# Patient Record
Sex: FEMALE | Race: OTHER | Employment: UNEMPLOYED
[De-identification: names, ages, dates, MRNs, and addresses within clinical notes are randomized per-mention and may not be internally consistent; named-entity substitution may affect disease eponyms.]

---

## 2023-07-21 ENCOUNTER — HOSPITAL ENCOUNTER (OUTPATIENT)
Facility: HOSPITAL | Age: 23
Setting detail: SPECIMEN
Discharge: HOME | End: 2023-07-24

## 2023-07-21 ENCOUNTER — INITIAL PRENATAL (OUTPATIENT)
Age: 23
End: 2023-07-21

## 2023-07-21 VITALS
WEIGHT: 157 LBS | OXYGEN SATURATION: 100 % | BODY MASS INDEX: 30.82 KG/M2 | SYSTOLIC BLOOD PRESSURE: 124 MMHG | RESPIRATION RATE: 16 BRPM | HEIGHT: 60 IN | DIASTOLIC BLOOD PRESSURE: 78 MMHG

## 2023-07-21 DIAGNOSIS — Z34.01 ENCOUNTER FOR SUPERVISION OF NORMAL FIRST PREGNANCY IN FIRST TRIMESTER: Primary | ICD-10-CM

## 2023-07-21 DIAGNOSIS — Z98.891 HISTORY OF CESAREAN DELIVERY: ICD-10-CM

## 2023-07-21 DIAGNOSIS — O99.210 OBESITY AFFECTING PREGNANCY, ANTEPARTUM: ICD-10-CM

## 2023-07-21 LAB
BILIRUBIN, URINE, POC: NEGATIVE
BLOOD URINE, POC: NEGATIVE
GLUCOSE URINE, POC: NEGATIVE
KETONES, URINE, POC: NEGATIVE
LEUKOCYTE ESTERASE, URINE, POC: NEGATIVE
NITRITE, URINE, POC: NEGATIVE
PH, URINE, POC: 7 (ref 4.6–8)
PROTEIN,URINE, POC: NEGATIVE
SPECIFIC GRAVITY, URINE, POC: 1.01 (ref 1–1.03)
URINALYSIS CLARITY, POC: CLEAR
URINALYSIS COLOR, POC: YELLOW
UROBILINOGEN, POC: NORMAL

## 2023-07-21 PROCEDURE — 87591 N.GONORRHOEAE DNA AMP PROB: CPT

## 2023-07-21 PROCEDURE — 87491 CHLMYD TRACH DNA AMP PROBE: CPT

## 2023-07-21 PROCEDURE — 88175 CYTOPATH C/V AUTO FLUID REDO: CPT

## 2023-07-21 PROCEDURE — 87661 TRICHOMONAS VAGINALIS AMPLIF: CPT

## 2023-07-21 PROCEDURE — 81003 URINALYSIS AUTO W/O SCOPE: CPT | Performed by: FAMILY MEDICINE

## 2023-07-21 ASSESSMENT — PATIENT HEALTH QUESTIONNAIRE - PHQ9
SUM OF ALL RESPONSES TO PHQ9 QUESTIONS 1 & 2: 0
SUM OF ALL RESPONSES TO PHQ QUESTIONS 1-9: 0
SUM OF ALL RESPONSES TO PHQ QUESTIONS 1-9: 0
2. FEELING DOWN, DEPRESSED OR HOPELESS: 0
SUM OF ALL RESPONSES TO PHQ QUESTIONS 1-9: 0
SUM OF ALL RESPONSES TO PHQ QUESTIONS 1-9: 0
1. LITTLE INTEREST OR PLEASURE IN DOING THINGS: 0

## 2023-07-21 NOTE — PROGRESS NOTES
[unfilled]   History and Physical    Patient: Pineda Vilchis MRN: [id number]  SSN: [id number]    YOB: 2000  Age: 25 y.o. Sex: female      Subjective:      Pineda Vilchis is a 25 y.o. female  at [address] who presents for IOB visit. Planned pregnancy  FOB is involved, they live together  She has 1 other child, who also lives with her   LMP 23  Had US at [facility]     No past medical history on file. No past surgical history on file. No family history on file. Social History     Tobacco Use    Smoking status: Not on file    Smokeless tobacco: Not on file   Substance Use Topics    Alcohol use: Not on file      Prior to Admission medications    Not on File        No Known Allergies    Review of Systems:  ROS negative except as noted in HPI. Objective:     Vitals:    23 1543   BP: 124/78   Resp: 16   SpO2: 100%   Weight: 157 lb (71.2 kg)   Height: 5' 0.24\" (1.53 m)        Physical Exam:  See prenatal physical exam.    OB Dating Ultrasound    Patient is a 25 y.o. [id number] with an estimated gestational age of [address] by LMP who presents for dating ultrasound. OFFICE PROCEDURE PROGRESS NOTE    Chart reviewed for the following:   Nawaf LOPEZ DO, have reviewed the History, Physical and updated the Allergic reactions for [address] performed immediately prior to start of procedure:   Nawaf LOPEZ DO, have performed the following reviews on Pineda Vilchis prior to the start of the procedure:            * Patient was identified by name and date of birth   * Agreement on procedure being performed was verified  * Risks and Benefits explained to the patient  * Procedure site verified and marked as necessary  * Patient was positioned for comfort  * Consent was signed and verified     Time: 4:17 PM  Date of procedure: 2023  Procedure performed by:   Nawaf Thomas DO  How tolerated by patient: tolerated

## 2023-07-21 NOTE — PROGRESS NOTES
Chief Complaint   Patient presents with    Initial Prenatal Visit        Patient identified by name and . Patient is a  at [address] Taking prenatal vitamins: Yes  Leakage of fluid: No  Vaginal bleeding: No  Feeling baby move if over 20 weeks: Not yet  Contractions: No  Pain: No    Vitals:    23 1543   BP: 124/78   Resp: 16   SpO2: 100%   Weight: 157 lb (71.2 kg)   Height: 5' 0.24\" (1.53 m)          There is no immunization history on file for this patient. 1. Have you been to the ER, urgent care clinic since your last visit? Hospitalized since your last visit? No    2. Have you seen or consulted any other health care providers outside of the [address] [place] [address] since your last visit? Include any pap smears or colon screening.  No

## 2023-07-22 LAB
ABO + RH BLD: NORMAL
ABO, EXTERNAL RESULT: NORMAL
BLOOD BANK CMNT PATIENT-IMP: NORMAL
BLOOD GROUP ANTIBODIES SERPL: NORMAL
ERYTHROCYTE [DISTWIDTH] IN BLOOD BY AUTOMATED COUNT: 13.5 % (ref 11.5–14.5)
EST. AVERAGE GLUCOSE BLD GHB EST-MCNC: 91 MG/DL
HBA1C MFR BLD: 4.8 % (ref 4–5.6)
HBV SURFACE AG SER QL: <0.1 INDEX
HBV SURFACE AG SER QL: NEGATIVE
HCT VFR BLD AUTO: 39.9 % (ref 35–47)
HCV AB SERPL QL IA: NONREACTIVE
HEPATITIS C ANTIBODY, EXTERNAL RESULT: NORMAL
HGB BLD-MCNC: 13 G/DL (ref 11.5–16)
HIV 1+2 AB+HIV1 P24 AG SERPL QL IA: NONREACTIVE
HIV 1/2 RESULT COMMENT: NORMAL
HIV, EXTERNAL RESULT: NORMAL
MCH RBC QN AUTO: 29.8 PG (ref 26–34)
MCHC RBC AUTO-ENTMCNC: 32.6 G/DL (ref 30–36.5)
MCV RBC AUTO: 91.5 FL (ref 80–99)
NRBC # BLD: 0 K/UL (ref 0–0.01)
NRBC BLD-RTO: 0 PER 100 WBC
PLATELET # BLD AUTO: 337 K/UL (ref 150–400)
PMV BLD AUTO: 10.5 FL (ref 8.9–12.9)
RBC # BLD AUTO: 4.36 M/UL (ref 3.8–5.2)
RH FACTOR, EXTERNAL RESULT: POSITIVE
RPR SER QL: NONREACTIVE
RPR, EXTERNAL RESULT: NORMAL
RUBELLA TITER, EXTERNAL RESULT: NORMAL
RUBV IGG SERPL IA-ACNC: NORMAL IU/ML
SPECIMEN EXP DATE BLD: NORMAL
WBC # BLD AUTO: 11.2 K/UL (ref 3.6–11)

## 2023-07-23 LAB
BACTERIA SPEC CULT: NORMAL
SERVICE CMNT-IMP: NORMAL

## 2023-07-24 LAB — VZV IGG SER IA-ACNC: 1634 INDEX

## 2023-07-25 LAB
C TRACH RRNA SPEC QL NAA+PROBE: NEGATIVE
C. TRACHOMATIS, EXTERNAL RESULT: NEGATIVE
HGB A MFR BLD: 97.5 % (ref 96.4–98.8)
HGB A2 MFR BLD COLUMN CHROM: 2.5 % (ref 1.8–3.2)
HGB F MFR BLD: 0 % (ref 0–2)
HGB FRACT BLD-IMP: NORMAL
HGB S MFR BLD: 0 %
N GONORRHOEA RRNA SPEC QL NAA+PROBE: NEGATIVE
N. GONORRHOEAE, EXTERNAL RESULT: NEGATIVE
SPECIMEN SOURCE: NORMAL
T VAGINALIS RRNA SPEC QL NAA+PROBE: NEGATIVE

## 2023-08-18 ENCOUNTER — ROUTINE PRENATAL (OUTPATIENT)
Age: 23
End: 2023-08-18

## 2023-08-18 VITALS
OXYGEN SATURATION: 100 % | BODY MASS INDEX: 30.43 KG/M2 | WEIGHT: 155 LBS | RESPIRATION RATE: 16 BRPM | SYSTOLIC BLOOD PRESSURE: 113 MMHG | HEIGHT: 60 IN | HEART RATE: 85 BPM | DIASTOLIC BLOOD PRESSURE: 71 MMHG

## 2023-08-18 DIAGNOSIS — Z34.01 ENCOUNTER FOR SUPERVISION OF NORMAL FIRST PREGNANCY IN FIRST TRIMESTER: Primary | ICD-10-CM

## 2023-08-18 DIAGNOSIS — Z98.891 HISTORY OF CESAREAN DELIVERY: ICD-10-CM

## 2023-08-18 DIAGNOSIS — O99.210 OBESITY AFFECTING PREGNANCY, ANTEPARTUM: ICD-10-CM

## 2023-08-18 NOTE — PROGRESS NOTES
Chief Complaint   Patient presents with    Routine Prenatal Visit     13 week 3 day        Patient identified by name and . Patient is a  at 13w3d. Taking prenatal vitamins: Yes  Leakage of fluid: No  Vaginal bleeding: No  Feeling baby move if over 20 weeks: Yes  Contractions: No  Pain: No    Vitals:    23 1058   BP: 113/71   Site: Right Upper Arm   Position: Sitting   Pulse: 85   Resp: 16   SpO2: 100%   Weight: 155 lb (70.3 kg)   Height: 5' 0.24\" (1.53 m)          There is no immunization history on file for this patient. 1. Have you been to the ER, urgent care clinic since your last visit? Hospitalized since your last visit? No    2. Have you seen or consulted any other health care providers outside of the [address] [place] [address] since your last visit? Include any pap smears or colon screening.  No

## 2023-08-18 NOTE — PROGRESS NOTES
20yo  @ 13w3d by 9 wk mahnaz   IUP: RH pos, undecided about NIPT - gave handout, anatomy scheduled 10/6 (pt aware)  Hx depression: in the past, mood currently stable   Hx CS: desires repeat   Obesity: A1C 4.8, ASA, wasn't technically obese at first visit so didn't get early gtt, since A1C normal will defer to the usual interval   LSIL pap: per ASCCP repeat pap in 1 year, pt aware

## 2023-08-18 NOTE — PROGRESS NOTES
Return OB Visit       Subjective:   Darryl Da Silva 25 y.o. [id number]  BENTON: 2/20/2024, by Ultrasound  GA:  13w3d. LOF: ***  Vaginal bleeding: ***  Fetal movement (after 20 weeks): ***  Contractions: ***  Prenatal vitamins: ***    Pt denies fever, chills, HA, vision disturbances, RUQ pain, chest pain, SOB, N/V, urinary problems, foul smelling vaginal discharge. Allergies- reviewed:   No Known Allergies  Medications- reviewed:   No current outpatient medications on file. No current facility-administered medications for this visit. Past Medical History- reviewed:  No past medical history on file. Past Surgical History- reviewed:   No past surgical history on file. Social History- reviewed:  Social History     Socioeconomic History    Marital status: Single     Spouse name: Not on file    Number of children: Not on file    Years of education: Not on file    Highest education level: Not on file   Occupational History    Not on file   Tobacco Use    Smoking status: Not on file    Smokeless tobacco: Not on file   Substance and Sexual Activity    Alcohol use: Not on file    Drug use: Not on file    Sexual activity: Not on file   Other Topics Concern    Not on file   Social History Narrative    Not on file     Social Determinants of Health     Financial Resource Strain: Not on file   Food Insecurity: Not on file   Transportation Needs: Not on file   Physical Activity: Not on file   Stress: Not on file   Social Connections: Not on file   Intimate Partner Violence: Not on file   Housing Stability: Not on file     Immunizations- reviewed: There is no immunization history on file for this patient.   Flu vaccine ***  Tdap ***    Objective:   LMP 05/02/2023     Physical Exam:  GENERAL APPEARANCE: alert, well appearing, in no apparent distress  ABDOMEN: gravid, fundal height *** cm, FHT present at *** bpm  PSYCH: normal mood and affect  CVE: ***    Labs  No results found for this or any previous visit

## 2023-09-08 ENCOUNTER — ROUTINE PRENATAL (OUTPATIENT)
Age: 23
End: 2023-09-08

## 2023-09-08 VITALS
HEART RATE: 100 BPM | RESPIRATION RATE: 16 BRPM | OXYGEN SATURATION: 98 % | WEIGHT: 159 LBS | DIASTOLIC BLOOD PRESSURE: 76 MMHG | BODY MASS INDEX: 30.81 KG/M2 | SYSTOLIC BLOOD PRESSURE: 122 MMHG

## 2023-09-08 DIAGNOSIS — Z34.01 ENCOUNTER FOR SUPERVISION OF NORMAL FIRST PREGNANCY IN FIRST TRIMESTER: Primary | ICD-10-CM

## 2023-09-08 DIAGNOSIS — Z98.891 HISTORY OF CESAREAN DELIVERY: ICD-10-CM

## 2023-09-08 DIAGNOSIS — O99.210 OBESITY AFFECTING PREGNANCY, ANTEPARTUM: ICD-10-CM

## 2023-09-08 NOTE — PROGRESS NOTES
Chief Complaint   Patient presents with    Routine Prenatal Visit        Patient identified by name and . Patient is a  at [address] [facility]. Taking prenatal vitamins: Yes  Leakage of fluid: No  Vaginal bleeding: No  Feeling baby move if over 20 weeks: Not yet  Contractions: No  Pain: No    Vitals:    23 1404   BP: 122/76   Site: Left Upper Arm   Position: Sitting   Cuff Size: Medium Adult   Pulse: 100   Resp: 16   SpO2: 98%   Weight: 159 lb (72.1 kg)          There is no immunization history on file for this patient. 1. Have you been to the ER, urgent care clinic since your last visit? Hospitalized since your last visit? No    2. Have you seen or consulted any other health care providers outside of the [address] [place] [address] Avenue since your last visit? Include any pap smears or colon screening.  No
Seen by Kelly Coyle, working with me today.     20yo  @ 16w3d by 9 wk mahnaz   IUP: RH pos, NIPT today, anatomy scheduled 10/6 (pt aware)  Hx depression: in the past, mood currently stable   Hx CS: for breech, desires TOLAC   Obesity: A1C 4.8, ASA, wasn't technically obese at first visit so didn't get early gtt, since A1C normal will defer to the usual interval   LSIL pap: per ASCCP repeat pap in 1 year, pt aware
Question:   You acknowledge that the testing ordered consists of the H14 Pan-Ethnic Panel plus additional genes? Answer:   Yes     Order Specific Question:   Practice ensures that HIPAA consent is obtained and will make available to [facility] upon request?     Answer:   Yes     Order Specific Question:   Do you want Sherif to schedule mobile phlebotomy for this patient? Answer:   No     Order Specific Question:   Select an order diagnosis     Answer:   Encounter for supervision of other normal pregnancy, second trimester [[id number]]     Order Specific Question:   Keenan-Sachs add-on test?     Answer:   No     Labor precautions discussed, including: Regular painful contractions, lasting for greater than one hour, taking your breath away; any vaginal bleeding; any leakage of fluid; or absent or decreased fetal movement. Call M.D. on call if any of these symptoms or signs occur. I have discussed the diagnosis with the patient and the intended plan as seen in the above orders. The patient has received an after-visit summary and questions were answered concerning future plans. I have discussed medication side effects and warnings with the patient as well. Informed pt to return to the office or go to the ER if she experiences vaginal bleeding, vaginal discharge, leaking of fluid, pelvic cramping.     Pt seen and discussed with Dr. Yusuf Rayo (attending physician)    Brandon Lopez MD  Family Medicine Resident

## 2023-09-15 LAB
Lab: NORMAL
NTRA FETAL FRACTION: NORMAL
NTRA GENDER OF FETUS: NORMAL
NTRA MONOSOMY X AGE-BASED RISK TEXT: NORMAL
NTRA MONOSOMY X RESULT TEXT: NORMAL
NTRA MONOSOMY X RISK SCORE TEXT: NORMAL
NTRA TRIPLOIDY RESULT TEXT: NORMAL
NTRA TRISOMY 13 AGE-BASED RISK TEXT: NORMAL
NTRA TRISOMY 13 RESULT TEXT: NORMAL
NTRA TRISOMY 13 RISK SCORE TEXT: NORMAL
NTRA TRISOMY 18 AGE-BASED RISK TEXT: NORMAL
NTRA TRISOMY 18 RESULT TEXT: NORMAL
NTRA TRISOMY 18 RISK SCORE TEXT: NORMAL
NTRA TRISOMY 21 AGE-BASED RISK TEXT: NORMAL
NTRA TRISOMY 21 RESULT TEXT: NORMAL
NTRA TRISOMY 21 RISK SCORE TEXT: NORMAL

## 2023-09-19 LAB
Lab: NEGATIVE
Lab: NORMAL
NTRA ALPHA-THALASSEMIA: NEGATIVE
NTRA BETA-HEMOGLOBINOPATHIES: NEGATIVE
NTRA CANAVAN DISEASE: NEGATIVE
NTRA CYSTIC FIBROSIS: NEGATIVE
NTRA DUCHENNE/BECKER MUSCULAR DYSTROPHY: NEGATIVE
NTRA FAMILIAL DYSAUTONOMIA: NEGATIVE
NTRA FRAGILE X SYNDROME: NEGATIVE
NTRA GALACTOSEMIA: NEGATIVE
NTRA GAUCHER DISEASE: NEGATIVE
NTRA MEDIUM CHAIN ACYL-COA DEHYDROGENASE DEFICIENCY: NEGATIVE
NTRA POLYCYSTIC KIDNEY DISEASE, AUTOSOMAL RECESSIVE: NEGATIVE
NTRA SMITH-LEMLI-OPITZ SYNDROME: NEGATIVE
NTRA SPINAL MUSCULAR ATROPHY: NEGATIVE
NTRA TAY-SACHS DISEASE: NEGATIVE

## 2023-10-06 ENCOUNTER — ROUTINE PRENATAL (OUTPATIENT)
Age: 23
End: 2023-10-06

## 2023-10-06 ENCOUNTER — ROUTINE PRENATAL (OUTPATIENT)
Age: 23
End: 2023-10-06
Payer: MEDICAID

## 2023-10-06 VITALS — DIASTOLIC BLOOD PRESSURE: 75 MMHG | HEART RATE: 88 BPM | SYSTOLIC BLOOD PRESSURE: 123 MMHG

## 2023-10-06 DIAGNOSIS — O99.210 OBESITY AFFECTING PREGNANCY, ANTEPARTUM, UNSPECIFIED OBESITY TYPE: Primary | ICD-10-CM

## 2023-10-06 DIAGNOSIS — Z98.891 HISTORY OF CESAREAN DELIVERY: ICD-10-CM

## 2023-10-06 DIAGNOSIS — Z98.891 HISTORY OF CESAREAN SECTION, UNKNOWN SCAR: ICD-10-CM

## 2023-10-06 DIAGNOSIS — Z34.01 ENCOUNTER FOR SUPERVISION OF NORMAL FIRST PREGNANCY IN FIRST TRIMESTER: Primary | ICD-10-CM

## 2023-10-06 PROCEDURE — 90674 CCIIV4 VAC NO PRSV 0.5 ML IM: CPT | Performed by: FAMILY MEDICINE

## 2023-10-06 SDOH — ECONOMIC STABILITY: INCOME INSECURITY: HOW HARD IS IT FOR YOU TO PAY FOR THE VERY BASICS LIKE FOOD, HOUSING, MEDICAL CARE, AND HEATING?: SOMEWHAT HARD

## 2023-10-06 SDOH — ECONOMIC STABILITY: FOOD INSECURITY: WITHIN THE PAST 12 MONTHS, YOU WORRIED THAT YOUR FOOD WOULD RUN OUT BEFORE YOU GOT MONEY TO BUY MORE.: PATIENT DECLINED

## 2023-10-06 SDOH — ECONOMIC STABILITY: FOOD INSECURITY: WITHIN THE PAST 12 MONTHS, THE FOOD YOU BOUGHT JUST DIDN'T LAST AND YOU DIDN'T HAVE MONEY TO GET MORE.: PATIENT DECLINED

## 2023-10-06 SDOH — ECONOMIC STABILITY: HOUSING INSECURITY
IN THE LAST 12 MONTHS, WAS THERE A TIME WHEN YOU DID NOT HAVE A STEADY PLACE TO SLEEP OR SLEPT IN A SHELTER (INCLUDING NOW)?: NO

## 2023-10-06 ASSESSMENT — ANXIETY QUESTIONNAIRES
1. FEELING NERVOUS, ANXIOUS, OR ON EDGE: 0
2. NOT BEING ABLE TO STOP OR CONTROL WORRYING: 0

## 2023-10-06 ASSESSMENT — PATIENT HEALTH QUESTIONNAIRE - PHQ9
SUM OF ALL RESPONSES TO PHQ QUESTIONS 1-9: 0
SUM OF ALL RESPONSES TO PHQ QUESTIONS 1-9: 0
SUM OF ALL RESPONSES TO PHQ9 QUESTIONS 1 & 2: 0
SUM OF ALL RESPONSES TO PHQ QUESTIONS 1-9: 0
2. FEELING DOWN, DEPRESSED OR HOPELESS: 0
1. LITTLE INTEREST OR PLEASURE IN DOING THINGS: 0
SUM OF ALL RESPONSES TO PHQ QUESTIONS 1-9: 0

## 2023-10-06 NOTE — PROCEDURES
PATIENT: Franklin Bernheim   -  : 2000   -  DOS:10/06/2023   -  INTERPRETING PROVIDER:Rupert aShni,   Indication  ========    Fetal anatomy survey    Method  ======    Transabdominal ultrasound examination. View: Sufficient    Dating  ======    Ultrasound examination on: 10/6/2023  GA by U/S based upon: AC, BPD, Femur, HC  GA by U/S 20 w + 1 d  BENTON by U/S: 2024  Assigned: based on ultrasound (AC, BPD, Femur, HC), selected on 10/6/2023  Assigned GA 20 w + 1 d  Assigned BENTON: 2024    Fetal Growth Overview  =================    Exam date        GA              BPD (mm)          HC (mm)              AC (mm)              FL (mm)             HL (mm)             EFW (g)  10/6/2023          20w 1d        45.8    37%        175.3     37%        152.6    55%        32.6     43%        30.2    46%        344     53%    Fetal Biometry  ============    Standard  BPD 45.8 mm 19w 6d 37% Hadlock  OFD 63.2 mm 21w 4d 91% Mariela  .3 mm 20w 0d 37% Hadlock  Cerebellum tr 20.4 mm 19w 4d 53% Hill  Nuchal fold 4.5 mm  .6 mm 20w 3d 55% Hadlock  Femur 32.6 mm 20w 1d 43% Hadlock  Humerus 30.2 mm 20w 0d 46% Mariela   g 20w 1d 53% Hadlock  EFW (lb) 0 lb  EFW (oz) 12 oz  EFW by: Hadlock (BPD-HC-AC-FL)  Extended   5.7 mm  CM 4.2 mm  23% Nicolaides  Nasal bone 6.6 mm  Head / Face / Neck  Nasal bone: present  Other Structures   bpm    General Evaluation  ==============    Cardiac activity present.  bpm. Fetal movements: visualized. Presentation: Cephalic  Placenta: Placental site: posterior, no evidence of low lying  Umbilical cord: Cord vessels: 3 vessel cord.  Insertion site: normal insertion  Amniotic fluid: Amount of AF: normal. MVP 5.0 cm    Fetal Anatomy  ===========    Cranium: normal  Lateral ventricles: normal  Choroid plexus: normal  Midline falx: normal  Cavum septi pellucidi: normal  Cerebellum: normal  Cisterna magna: normal  Head /

## 2023-10-06 NOTE — PROGRESS NOTES
Baby moving     20yo  @ 20w3d by 9 wk sono   IUP: RH pos, NIPT low risk, anatomy normal   Hx depression: in the past, mood currently stable   Hx CS: for breech, desires TOLAC   Obesity: A1C 4.8, ASA, wasn't technically obese at first visit so didn't get early gtt, since A1C normal will defer to the usual interval   LSIL pap: per ASCCP repeat pap in 1 year, pt aware

## 2023-11-03 ENCOUNTER — ROUTINE PRENATAL (OUTPATIENT)
Age: 23
End: 2023-11-03

## 2023-11-03 VITALS
TEMPERATURE: 98.2 F | SYSTOLIC BLOOD PRESSURE: 121 MMHG | HEART RATE: 112 BPM | HEIGHT: 60 IN | WEIGHT: 170 LBS | BODY MASS INDEX: 33.38 KG/M2 | DIASTOLIC BLOOD PRESSURE: 78 MMHG | OXYGEN SATURATION: 97 % | RESPIRATION RATE: 18 BRPM

## 2023-11-03 DIAGNOSIS — Z3A.24 24 WEEKS GESTATION OF PREGNANCY: Primary | ICD-10-CM

## 2023-11-03 RX ORDER — ASPIRIN 81 MG/1
81 TABLET ORAL DAILY
COMMUNITY

## 2023-11-03 ASSESSMENT — PATIENT HEALTH QUESTIONNAIRE - PHQ9
1. LITTLE INTEREST OR PLEASURE IN DOING THINGS: 0
SUM OF ALL RESPONSES TO PHQ QUESTIONS 1-9: 0
SUM OF ALL RESPONSES TO PHQ9 QUESTIONS 1 & 2: 0
2. FEELING DOWN, DEPRESSED OR HOPELESS: 0

## 2023-11-04 LAB
ERYTHROCYTE [DISTWIDTH] IN BLOOD BY AUTOMATED COUNT: 13.7 % (ref 11.5–14.5)
GLUCOSE 1H P 100 G GLC PO SERPL-MCNC: 127 MG/DL (ref 65–140)
HBV SURFACE AB SER QL: REACTIVE
HBV SURFACE AB SER-ACNC: 94.99 MIU/ML
HCT VFR BLD AUTO: 36.6 % (ref 35–47)
HGB BLD-MCNC: 11.5 G/DL (ref 11.5–16)
MCH RBC QN AUTO: 29 PG (ref 26–34)
MCHC RBC AUTO-ENTMCNC: 31.4 G/DL (ref 30–36.5)
MCV RBC AUTO: 92.2 FL (ref 80–99)
NRBC # BLD: 0 K/UL (ref 0–0.01)
NRBC BLD-RTO: 0 PER 100 WBC
PLATELET # BLD AUTO: 343 K/UL (ref 150–400)
PMV BLD AUTO: 10.4 FL (ref 8.9–12.9)
RBC # BLD AUTO: 3.97 M/UL (ref 3.8–5.2)
WBC # BLD AUTO: 10.6 K/UL (ref 3.6–11)

## 2023-11-05 LAB
HBV CORE AB SERPL QL IA: NEGATIVE
HEP B, EXTERNAL RESULT: NEGATIVE

## 2023-12-08 ENCOUNTER — ROUTINE PRENATAL (OUTPATIENT)
Age: 23
End: 2023-12-08
Payer: MEDICAID

## 2023-12-08 VITALS
WEIGHT: 177 LBS | OXYGEN SATURATION: 98 % | DIASTOLIC BLOOD PRESSURE: 78 MMHG | TEMPERATURE: 98.5 F | SYSTOLIC BLOOD PRESSURE: 123 MMHG | HEIGHT: 60 IN | HEART RATE: 105 BPM | BODY MASS INDEX: 34.75 KG/M2 | RESPIRATION RATE: 16 BRPM

## 2023-12-08 DIAGNOSIS — Z98.891 HISTORY OF CESAREAN DELIVERY: ICD-10-CM

## 2023-12-08 DIAGNOSIS — O99.210 OBESITY AFFECTING PREGNANCY, ANTEPARTUM, UNSPECIFIED OBESITY TYPE: ICD-10-CM

## 2023-12-08 DIAGNOSIS — Z34.01 ENCOUNTER FOR SUPERVISION OF NORMAL FIRST PREGNANCY IN FIRST TRIMESTER: Primary | ICD-10-CM

## 2023-12-08 PROCEDURE — 90715 TDAP VACCINE 7 YRS/> IM: CPT | Performed by: FAMILY MEDICINE

## 2023-12-08 ASSESSMENT — PATIENT HEALTH QUESTIONNAIRE - PHQ9
SUM OF ALL RESPONSES TO PHQ QUESTIONS 1-9: 0
1. LITTLE INTEREST OR PLEASURE IN DOING THINGS: 0
SUM OF ALL RESPONSES TO PHQ QUESTIONS 1-9: 0
2. FEELING DOWN, DEPRESSED OR HOPELESS: 0
SUM OF ALL RESPONSES TO PHQ9 QUESTIONS 1 & 2: 0
SUM OF ALL RESPONSES TO PHQ QUESTIONS 1-9: 0
SUM OF ALL RESPONSES TO PHQ QUESTIONS 1-9: 0

## 2023-12-08 NOTE — PROGRESS NOTES
Baby moving     20yo  @ 29w3d by 9 wk sono   IUP: RH pos, NIPT low risk, anatomy normal, passed GTT, CBC normal, +tdap  Hx depression: in the past, mood currently stable   Hx CS: for breech, desires repeat cs - scheduled  12:30 - inform next visit and also make April aware for scheduling   Obesity: A1C 4.8, ASA, wasn't technically obese at first visit so didn't get early gtt  LSIL pap: per ASCCP repeat pap in 1 year, pt aware     Estimated Date of Delivery: 24

## 2024-01-12 ENCOUNTER — ROUTINE PRENATAL (OUTPATIENT)
Age: 24
End: 2024-01-12

## 2024-01-12 VITALS
OXYGEN SATURATION: 98 % | WEIGHT: 183 LBS | RESPIRATION RATE: 16 BRPM | DIASTOLIC BLOOD PRESSURE: 77 MMHG | SYSTOLIC BLOOD PRESSURE: 123 MMHG | HEART RATE: 112 BPM | BODY MASS INDEX: 35.72 KG/M2

## 2024-01-12 DIAGNOSIS — O99.210 OBESITY AFFECTING PREGNANCY, ANTEPARTUM, UNSPECIFIED OBESITY TYPE: ICD-10-CM

## 2024-01-12 DIAGNOSIS — O09.90 SUPERVISION OF HIGH RISK PREGNANCY, ANTEPARTUM: Primary | ICD-10-CM

## 2024-01-12 DIAGNOSIS — Z98.891 HISTORY OF CESAREAN DELIVERY: ICD-10-CM

## 2024-01-12 NOTE — PROGRESS NOTES
Chief Complaint   Patient presents with    Routine Prenatal Visit        Patient identified by name and . Patient is a  at 34w3d.    Taking prenatal vitamins: Yes  Leakage of fluid: No  Vaginal bleeding: No  Feeling baby move if over 20 weeks: Yes  Contractions: No  Pain: No    BP elevated and heart rate tachy. Recheck normal.    Flu shot today.    Vitals:    24 1258 24 1313   BP: (!) 144/81 123/77   Site: Left Upper Arm    Position: Sitting    Cuff Size: Medium Adult    Pulse: (!) 112    Resp: 16    SpO2: 98%    Weight: 83 kg (183 lb)         Immunization History   Administered Date(s) Administered    Influenza, FLUCELVAX, (age 6 mo+), MDCK, PF, 0.5mL 10/06/2023    TDaP, ADACEL (age 10y-64y), BOOSTRIX (age 10y+), IM, 0.5mL 2023       1. Have you been to the ER, urgent care clinic since your last visit?  Hospitalized since your last visit?No    2. Have you seen or consulted any other health care providers outside of the [facility] since your last visit?  Include any pap smears or colon screening. No

## 2024-01-12 NOTE — PROGRESS NOTES
Baby moving   No HA or vision changes, no palpitations    21yo  @ 34w3d by 9 wk sono   IUP: RH pos, NIPT low risk, anatomy normal, passed GTT, CBC normal, +tdap+flu   Hx depression: in the past, mood currently stable   Hx CS: for breech, desires repeat cs - scheduled  12:30, pt aware   Obesity: A1C 4.8  LSIL pap: per ASCCP repeat pap in 1 year, pt aware   Isolated elevated BP: felt really hot when she walked into our clinic, denies HA or vision changes, repeat BP was normal, will check labs including TSH given high HR     Estimated Date of Delivery: 24

## 2024-01-13 LAB
ALBUMIN SERPL-MCNC: 3.1 G/DL (ref 3.5–5)
ALBUMIN/GLOB SERPL: 0.8 (ref 1.1–2.2)
ALP SERPL-CCNC: 187 U/L (ref 45–117)
ALT SERPL-CCNC: 29 U/L (ref 12–78)
ANION GAP SERPL CALC-SCNC: 4 MMOL/L (ref 5–15)
AST SERPL-CCNC: 16 U/L (ref 15–37)
BILIRUB SERPL-MCNC: 0.2 MG/DL (ref 0.2–1)
BUN SERPL-MCNC: 6 MG/DL (ref 6–20)
BUN/CREAT SERPL: 12 (ref 12–20)
CALCIUM SERPL-MCNC: 9.3 MG/DL (ref 8.5–10.1)
CHLORIDE SERPL-SCNC: 106 MMOL/L (ref 97–108)
CO2 SERPL-SCNC: 27 MMOL/L (ref 21–32)
CREAT SERPL-MCNC: 0.51 MG/DL (ref 0.55–1.02)
CREAT UR-MCNC: 16.2 MG/DL
ERYTHROCYTE [DISTWIDTH] IN BLOOD BY AUTOMATED COUNT: 14.9 % (ref 11.5–14.5)
GLOBULIN SER CALC-MCNC: 4.1 G/DL (ref 2–4)
GLUCOSE SERPL-MCNC: 97 MG/DL (ref 65–100)
HCT VFR BLD AUTO: 37 % (ref 35–47)
HGB BLD-MCNC: 12.6 G/DL (ref 11.5–16)
MCH RBC QN AUTO: 29.5 PG (ref 26–34)
MCHC RBC AUTO-ENTMCNC: 34.1 G/DL (ref 30–36.5)
MCV RBC AUTO: 86.7 FL (ref 80–99)
NRBC # BLD: 0 K/UL (ref 0–0.01)
NRBC BLD-RTO: 0 PER 100 WBC
PLATELET # BLD AUTO: 292 K/UL (ref 150–400)
PMV BLD AUTO: 10.9 FL (ref 8.9–12.9)
POTASSIUM SERPL-SCNC: 4.5 MMOL/L (ref 3.5–5.1)
PROT SERPL-MCNC: 7.2 G/DL (ref 6.4–8.2)
PROT UR-MCNC: <5 MG/DL (ref 0–11.9)
PROT/CREAT UR-RTO: <0.3
RBC # BLD AUTO: 4.27 M/UL (ref 3.8–5.2)
SODIUM SERPL-SCNC: 137 MMOL/L (ref 136–145)
TSH SERPL DL<=0.05 MIU/L-ACNC: 1.19 UIU/ML (ref 0.36–3.74)
WBC # BLD AUTO: 9.6 K/UL (ref 3.6–11)

## 2024-01-26 ENCOUNTER — ROUTINE PRENATAL (OUTPATIENT)
Age: 24
End: 2024-01-26
Payer: MEDICAID

## 2024-01-26 ENCOUNTER — ROUTINE PRENATAL (OUTPATIENT)
Age: 24
End: 2024-01-26

## 2024-01-26 VITALS — SYSTOLIC BLOOD PRESSURE: 135 MMHG | DIASTOLIC BLOOD PRESSURE: 88 MMHG | HEART RATE: 118 BPM

## 2024-01-26 VITALS
SYSTOLIC BLOOD PRESSURE: 127 MMHG | DIASTOLIC BLOOD PRESSURE: 82 MMHG | OXYGEN SATURATION: 98 % | BODY MASS INDEX: 36.11 KG/M2 | WEIGHT: 185 LBS | HEART RATE: 101 BPM | RESPIRATION RATE: 16 BRPM

## 2024-01-26 DIAGNOSIS — O09.90 SUPERVISION OF HIGH RISK PREGNANCY, ANTEPARTUM: Primary | ICD-10-CM

## 2024-01-26 DIAGNOSIS — Z78.9 LANGUAGE BARRIER: ICD-10-CM

## 2024-01-26 DIAGNOSIS — O99.210 OBESITY AFFECTING PREGNANCY, ANTEPARTUM, UNSPECIFIED OBESITY TYPE: Primary | ICD-10-CM

## 2024-01-26 DIAGNOSIS — R09.89 LABILE HYPERTENSION: ICD-10-CM

## 2024-01-26 DIAGNOSIS — Z98.891 HISTORY OF CESAREAN DELIVERY: ICD-10-CM

## 2024-01-26 DIAGNOSIS — O99.210 OBESITY AFFECTING PREGNANCY, ANTEPARTUM, UNSPECIFIED OBESITY TYPE: ICD-10-CM

## 2024-01-26 DIAGNOSIS — Z3A.36 36 WEEKS GESTATION OF PREGNANCY: ICD-10-CM

## 2024-01-26 DIAGNOSIS — O34.219 PREVIOUS CESAREAN DELIVERY, ANTEPARTUM CONDITION OR COMPLICATION: ICD-10-CM

## 2024-01-26 PROCEDURE — 76816 OB US FOLLOW-UP PER FETUS: CPT | Performed by: OBSTETRICS & GYNECOLOGY

## 2024-01-26 PROCEDURE — 99213 OFFICE O/P EST LOW 20 MIN: CPT | Performed by: OBSTETRICS & GYNECOLOGY

## 2024-01-26 PROCEDURE — 76819 FETAL BIOPHYS PROFIL W/O NST: CPT | Performed by: OBSTETRICS & GYNECOLOGY

## 2024-01-26 PROCEDURE — 3079F DIAST BP 80-89 MM HG: CPT | Performed by: OBSTETRICS & GYNECOLOGY

## 2024-01-26 PROCEDURE — 3075F SYST BP GE 130 - 139MM HG: CPT | Performed by: OBSTETRICS & GYNECOLOGY

## 2024-01-26 ASSESSMENT — PATIENT HEALTH QUESTIONNAIRE - PHQ9
SUM OF ALL RESPONSES TO PHQ9 QUESTIONS 1 & 2: 0
SUM OF ALL RESPONSES TO PHQ QUESTIONS 1-9: 0
SUM OF ALL RESPONSES TO PHQ QUESTIONS 1-9: 0
2. FEELING DOWN, DEPRESSED OR HOPELESS: 0
SUM OF ALL RESPONSES TO PHQ QUESTIONS 1-9: 0
1. LITTLE INTEREST OR PLEASURE IN DOING THINGS: 0
SUM OF ALL RESPONSES TO PHQ QUESTIONS 1-9: 0

## 2024-01-26 NOTE — PROGRESS NOTES
Baby moving     23yo  @ 36w3d by 9 wk sono   IUP: RH pos, NIPT low risk, anatomy normal, passed GTT, CBC normal, +tdap+flu, GBS collected  Hx depression: in the past, mood currently stable   Hx CS: for breech, desires repeat cs - scheduled  12:30, pt aware   Obesity: A1C 4.8  LSIL pap: per ASCCP repeat pap in 1 year, pt aware     Estimated Date of Delivery: 24

## 2024-01-26 NOTE — PROCEDURES
PATIENT: FERNIE WALLER   -  : 2000   -  DOS:2024   -  INTERPRETING PROVIDER:Shen Alejandra,   Indication  ========    Obesity BMI 35.7, Labile hypertension, previous  section. Transient episode of fetal tachycardia.    Method  ======    Transabdominal ultrasound examination. View: Suboptimal view: limited by maternal body habitus. Suboptimal view: limited by late gestational age    Pregnancy  =========    Rodriguez pregnancy. Number of fetuses: 1    Dating  ======    LMP on: 2023  Cycle: regular cycle  GA by LMP 38 w + 3 d  BENTON by LMP: 2024  Previous Ultrasound on: 2023  Type of prior assessment: GA  GA at prior assessment date 9 w + 3 d  GA by previous U/S 36 w + 3 d  BENTON by previous Ultrasound: 2024  Ultrasound examination on: 2024  GA by U/S based upon: AC, BPD, Femur, HC  GA by U/S 36 w + 4 d  BENTON by U/S: 2024  Assigned: based on ultrasound (GA), selected on 2024  Assigned GA 36 w + 3 d  Assigned BENTON: 2024    Fetal Biometry  ============    Standard  BPD 89.8 mm 36w 3d 60% Hadlock  .7 mm 38w 6d 82% Mariela  .8 mm 36w 5d 29% Hadlock  .2 mm 37w 4d 89% Hadlock  Femur 69.4 mm 35w 4d 26% Hadlock  Humerus 57.5 mm 33w 3d 8% Mariela  EFW 3,063 g 37w 1d 66% Hadlock  EFW (lb) 6 lb  EFW (oz) 12 oz  EFW by: Hadlock (BPD-HC-AC-FL)  Head / Face / Neck  Nasal bone: present  Other Structures   bpm    General Evaluation  ==============    Cardiac activity present.  bpm. Fetal movements: visualized. Presentation: Cephalic  Placenta: Placental site: posterior, appropriate distance from the internal os  Umbilical cord: Cord vessels: 3 vessel cord. Insertion site: normal insertion  Amniotic fluid: Amount of AF: normal. MVP 6.6 cm. NATE 12.7 cm. Q1 6.6 cm, Q2 0.0 cm, Q3 3.3 cm, Q4 2.7 cm    Fetal Anatomy  ===========    Face  Nasal bone: present  Heart / Thorax  Cardiac rhythm: regular (normal)  FHR (Atrial) 174

## 2024-01-30 LAB
B-HEM STREP SPEC QL CULT: NEGATIVE
GBS, EXTERNAL RESULT: NEGATIVE

## 2024-01-31 ENCOUNTER — ROUTINE PRENATAL (OUTPATIENT)
Age: 24
End: 2024-01-31
Payer: MEDICAID

## 2024-01-31 ENCOUNTER — HOSPITAL ENCOUNTER (INPATIENT)
Facility: HOSPITAL | Age: 24
LOS: 3 days | Discharge: HOME | DRG: 540 | End: 2024-02-03
Attending: FAMILY MEDICINE | Admitting: FAMILY MEDICINE
Payer: MEDICAID

## 2024-01-31 VITALS — DIASTOLIC BLOOD PRESSURE: 100 MMHG | SYSTOLIC BLOOD PRESSURE: 154 MMHG | OXYGEN SATURATION: 98 % | HEART RATE: 122 BPM

## 2024-01-31 DIAGNOSIS — O13.3 GESTATIONAL HYPERTENSION, THIRD TRIMESTER: ICD-10-CM

## 2024-01-31 DIAGNOSIS — Z98.891 HISTORY OF CESAREAN DELIVERY: ICD-10-CM

## 2024-01-31 DIAGNOSIS — Z98.891 HISTORY OF CESAREAN SECTION, UNKNOWN SCAR: ICD-10-CM

## 2024-01-31 DIAGNOSIS — O99.210 OBESITY AFFECTING PREGNANCY, ANTEPARTUM, UNSPECIFIED OBESITY TYPE: Primary | ICD-10-CM

## 2024-01-31 PROBLEM — O13.9 GESTATIONAL HYPERTENSION AFFECTING THIRD PREGNANCY: Status: ACTIVE | Noted: 2024-01-31

## 2024-01-31 LAB
ALBUMIN SERPL-MCNC: 2.8 G/DL (ref 3.5–5)
ALBUMIN/GLOB SERPL: 0.7 (ref 1.1–2.2)
ALP SERPL-CCNC: 221 U/L (ref 45–117)
ALT SERPL-CCNC: 24 U/L (ref 12–78)
ANION GAP SERPL CALC-SCNC: 9 MMOL/L (ref 5–15)
AST SERPL-CCNC: 23 U/L (ref 15–37)
BILIRUB SERPL-MCNC: 0.3 MG/DL (ref 0.2–1)
BUN SERPL-MCNC: 4 MG/DL (ref 6–20)
BUN/CREAT SERPL: 8 (ref 12–20)
CALCIUM SERPL-MCNC: 9.1 MG/DL (ref 8.5–10.1)
CHLORIDE SERPL-SCNC: 111 MMOL/L (ref 97–108)
CO2 SERPL-SCNC: 21 MMOL/L (ref 21–32)
CREAT SERPL-MCNC: 0.48 MG/DL (ref 0.55–1.02)
CREAT UR-MCNC: 34 MG/DL
ERYTHROCYTE [DISTWIDTH] IN BLOOD BY AUTOMATED COUNT: 16 % (ref 11.5–14.5)
GLOBULIN SER CALC-MCNC: 4.1 G/DL (ref 2–4)
GLUCOSE SERPL-MCNC: 86 MG/DL (ref 65–100)
HCT VFR BLD AUTO: 36.2 % (ref 35–47)
HGB BLD-MCNC: 12.2 G/DL (ref 11.5–16)
MCH RBC QN AUTO: 28.8 PG (ref 26–34)
MCHC RBC AUTO-ENTMCNC: 33.7 G/DL (ref 30–36.5)
MCV RBC AUTO: 85.6 FL (ref 80–99)
NRBC # BLD: 0 K/UL (ref 0–0.01)
NRBC BLD-RTO: 0 PER 100 WBC
PLATELET # BLD AUTO: 216 K/UL (ref 150–400)
PMV BLD AUTO: 11.6 FL (ref 8.9–12.9)
POTASSIUM SERPL-SCNC: 4.3 MMOL/L (ref 3.5–5.1)
PROT SERPL-MCNC: 6.9 G/DL (ref 6.4–8.2)
PROT UR-MCNC: 44 MG/DL (ref 0–11.9)
PROT/CREAT UR-RTO: 1.3
RBC # BLD AUTO: 4.23 M/UL (ref 3.8–5.2)
SODIUM SERPL-SCNC: 141 MMOL/L (ref 136–145)
TSH SERPL DL<=0.05 MIU/L-ACNC: 0.95 UIU/ML (ref 0.36–3.74)
WBC # BLD AUTO: 8.2 K/UL (ref 3.6–11)

## 2024-01-31 PROCEDURE — 86900 BLOOD TYPING SEROLOGIC ABO: CPT

## 2024-01-31 PROCEDURE — 80053 COMPREHEN METABOLIC PANEL: CPT

## 2024-01-31 PROCEDURE — 1100000000 HC RM PRIVATE

## 2024-01-31 PROCEDURE — 99214 OFFICE O/P EST MOD 30 MIN: CPT | Performed by: OBSTETRICS & GYNECOLOGY

## 2024-01-31 PROCEDURE — 76819 FETAL BIOPHYS PROFIL W/O NST: CPT | Performed by: OBSTETRICS & GYNECOLOGY

## 2024-01-31 PROCEDURE — 84156 ASSAY OF PROTEIN URINE: CPT

## 2024-01-31 PROCEDURE — 36415 COLL VENOUS BLD VENIPUNCTURE: CPT

## 2024-01-31 PROCEDURE — 86850 RBC ANTIBODY SCREEN: CPT

## 2024-01-31 PROCEDURE — 85027 COMPLETE CBC AUTOMATED: CPT

## 2024-01-31 PROCEDURE — 84443 ASSAY THYROID STIM HORMONE: CPT

## 2024-01-31 PROCEDURE — 86901 BLOOD TYPING SEROLOGIC RH(D): CPT

## 2024-01-31 PROCEDURE — 82570 ASSAY OF URINE CREATININE: CPT

## 2024-01-31 PROCEDURE — 2580000003 HC RX 258: Performed by: STUDENT IN AN ORGANIZED HEALTH CARE EDUCATION/TRAINING PROGRAM

## 2024-01-31 RX ORDER — SODIUM CHLORIDE, SODIUM LACTATE, POTASSIUM CHLORIDE, AND CALCIUM CHLORIDE .6; .31; .03; .02 G/100ML; G/100ML; G/100ML; G/100ML
1000 INJECTION, SOLUTION INTRAVENOUS ONCE
Status: DISCONTINUED | OUTPATIENT
Start: 2024-01-31 | End: 2024-02-03 | Stop reason: HOSPADM

## 2024-01-31 RX ORDER — SODIUM CHLORIDE 0.9 % (FLUSH) 0.9 %
5-40 SYRINGE (ML) INJECTION EVERY 12 HOURS SCHEDULED
Status: DISCONTINUED | OUTPATIENT
Start: 2024-01-31 | End: 2024-02-02 | Stop reason: ALTCHOICE

## 2024-01-31 RX ORDER — SODIUM CHLORIDE, SODIUM LACTATE, POTASSIUM CHLORIDE, CALCIUM CHLORIDE 600; 310; 30; 20 MG/100ML; MG/100ML; MG/100ML; MG/100ML
INJECTION, SOLUTION INTRAVENOUS CONTINUOUS
Status: DISCONTINUED | OUTPATIENT
Start: 2024-01-31 | End: 2024-02-03 | Stop reason: HOSPADM

## 2024-01-31 RX ORDER — SODIUM CHLORIDE, SODIUM LACTATE, POTASSIUM CHLORIDE, CALCIUM CHLORIDE 600; 310; 30; 20 MG/100ML; MG/100ML; MG/100ML; MG/100ML
INJECTION, SOLUTION INTRAVENOUS CONTINUOUS
Status: DISCONTINUED | OUTPATIENT
Start: 2024-01-31 | End: 2024-01-31

## 2024-01-31 RX ORDER — ONDANSETRON 2 MG/ML
4 INJECTION INTRAMUSCULAR; INTRAVENOUS EVERY 6 HOURS PRN
Status: DISCONTINUED | OUTPATIENT
Start: 2024-01-31 | End: 2024-02-03 | Stop reason: HOSPADM

## 2024-01-31 RX ORDER — SODIUM CHLORIDE, SODIUM LACTATE, POTASSIUM CHLORIDE, AND CALCIUM CHLORIDE .6; .31; .03; .02 G/100ML; G/100ML; G/100ML; G/100ML
500 INJECTION, SOLUTION INTRAVENOUS ONCE
Status: COMPLETED | OUTPATIENT
Start: 2024-01-31 | End: 2024-01-31

## 2024-01-31 RX ORDER — SODIUM CHLORIDE 0.9 % (FLUSH) 0.9 %
10 SYRINGE (ML) INJECTION PRN
Status: DISCONTINUED | OUTPATIENT
Start: 2024-01-31 | End: 2024-02-02 | Stop reason: ALTCHOICE

## 2024-01-31 RX ORDER — SODIUM CHLORIDE 9 MG/ML
INJECTION, SOLUTION INTRAVENOUS PRN
Status: DISCONTINUED | OUTPATIENT
Start: 2024-01-31 | End: 2024-02-03 | Stop reason: HOSPADM

## 2024-01-31 RX ORDER — ACETAMINOPHEN 325 MG/1
975 TABLET ORAL ONCE
Status: DISCONTINUED | OUTPATIENT
Start: 2024-01-31 | End: 2024-02-01

## 2024-01-31 RX ADMIN — SODIUM CHLORIDE, POTASSIUM CHLORIDE, SODIUM LACTATE AND CALCIUM CHLORIDE 500 ML: 600; 310; 30; 20 INJECTION, SOLUTION INTRAVENOUS at 17:42

## 2024-01-31 NOTE — H&P
[facility] Residency      History & Physical    Name: Ariella Church MRN: [id number]  SSN: [id number]    YOB: 2000  Age: 23 y.o.  Sex: female      Subjective:     Reason for Admission:  Pregnancy and Gestational Hypertension    History of Present Illness: Ms. Bryson Church is a 23 y.o.   female with an estimated gestational age of 37w1d with Estimated Date of Delivery: 24. Patient seen at [facility] today, with elevated BP there, as well as , meeting criteria for gestational hypertension. She is 37 weeks, so deliver is indicated. Pregnancy has been complicated by obesity, hx of CS. Patient denies SOB, CP, HA, vision changes, contractions, LOF, bleeding.    OB History    Para Term  AB Living   3 1 1   1 1   SAB IAB Ectopic Molar Multiple Live Births             1      # Outcome Date GA Lbr Zachary/2nd Weight Sex Delivery Anes PTL Lv   3 Current            2 AB 2023 8w0d    SAB      1 Term 09/15/15 40w0d  3.629 kg (8 lb)  CS-LTranv   YEISON      Birth Comments: CS done for non-vertex presentation     Past Medical History:   Diagnosis Date    Gestational hypertension affecting third pregnancy 2024     No past surgical history on file.  Social History     Occupational History    Not on file   Tobacco Use    Smoking status: Never    Smokeless tobacco: Never   Vaping Use    Vaping Use: Never used   Substance and Sexual Activity    Alcohol use: Not Currently    Drug use: Never    Sexual activity: Not on file      No family history on file.    No Known Allergies  Prior to Admission medications    Medication Sig Start Date End Date Taking? Authorizing Provider   aspirin 81 MG EC tablet Take 1 tablet by mouth daily    Alexei Mensah MD   Prenatal MV-Min-Fe Fum-FA-DHA (PRENATAL 1 PO) Take by mouth    Alexei Mensah MD        Review of Systems:  A comprehensive review of systems was negative except for that written in the History of

## 2024-02-01 ENCOUNTER — ANESTHESIA (OUTPATIENT)
Facility: HOSPITAL | Age: 24
End: 2024-02-01
Payer: MEDICAID

## 2024-02-01 ENCOUNTER — ANESTHESIA EVENT (OUTPATIENT)
Facility: HOSPITAL | Age: 24
End: 2024-02-01
Payer: MEDICAID

## 2024-02-01 PROBLEM — O14.93 PRE-ECLAMPSIA IN THIRD TRIMESTER: Status: ACTIVE | Noted: 2024-01-31

## 2024-02-01 LAB
ABO + RH BLD: NORMAL
BLOOD GROUP ANTIBODIES SERPL: NORMAL
SPECIMEN EXP DATE BLD: NORMAL

## 2024-02-01 PROCEDURE — 2580000003 HC RX 258

## 2024-02-01 PROCEDURE — 7100000000 HC PACU RECOVERY - FIRST 15 MIN: Performed by: FAMILY MEDICINE

## 2024-02-01 PROCEDURE — 3609079900 HC CESAREAN SECTION: Performed by: FAMILY MEDICINE

## 2024-02-01 PROCEDURE — 1120000000 HC RM PRIVATE OB

## 2024-02-01 PROCEDURE — 6370000000 HC RX 637 (ALT 250 FOR IP)

## 2024-02-01 PROCEDURE — 6370000000 HC RX 637 (ALT 250 FOR IP): Performed by: OBSTETRICS & GYNECOLOGY

## 2024-02-01 PROCEDURE — 6360000002 HC RX W HCPCS: Performed by: FAMILY MEDICINE

## 2024-02-01 PROCEDURE — 59510 CESAREAN DELIVERY: CPT | Performed by: FAMILY MEDICINE

## 2024-02-01 PROCEDURE — 2500000003 HC RX 250 WO HCPCS: Performed by: NURSE ANESTHETIST, CERTIFIED REGISTERED

## 2024-02-01 PROCEDURE — 6360000002 HC RX W HCPCS: Performed by: STUDENT IN AN ORGANIZED HEALTH CARE EDUCATION/TRAINING PROGRAM

## 2024-02-01 PROCEDURE — 6360000002 HC RX W HCPCS

## 2024-02-01 PROCEDURE — 3700000001 HC ADD 15 MINUTES (ANESTHESIA): Performed by: FAMILY MEDICINE

## 2024-02-01 PROCEDURE — 7100000001 HC PACU RECOVERY - ADDTL 15 MIN: Performed by: FAMILY MEDICINE

## 2024-02-01 PROCEDURE — 3700000000 HC ANESTHESIA ATTENDED CARE: Performed by: FAMILY MEDICINE

## 2024-02-01 PROCEDURE — 6360000002 HC RX W HCPCS: Performed by: NURSE ANESTHETIST, CERTIFIED REGISTERED

## 2024-02-01 PROCEDURE — 2709999900 HC NON-CHARGEABLE SUPPLY: Performed by: FAMILY MEDICINE

## 2024-02-01 PROCEDURE — 51702 INSERT TEMP BLADDER CATH: CPT

## 2024-02-01 PROCEDURE — 2580000003 HC RX 258: Performed by: STUDENT IN AN ORGANIZED HEALTH CARE EDUCATION/TRAINING PROGRAM

## 2024-02-01 RX ORDER — LABETALOL HYDROCHLORIDE 5 MG/ML
20 INJECTION, SOLUTION INTRAVENOUS ONCE
Status: COMPLETED | OUTPATIENT
Start: 2024-02-01 | End: 2024-02-01

## 2024-02-01 RX ORDER — OXYCODONE HYDROCHLORIDE 5 MG/1
10 TABLET ORAL EVERY 4 HOURS PRN
Status: DISCONTINUED | OUTPATIENT
Start: 2024-02-01 | End: 2024-02-03 | Stop reason: HOSPADM

## 2024-02-01 RX ORDER — IBUPROFEN 800 MG/1
800 TABLET ORAL EVERY 8 HOURS SCHEDULED
Status: DISCONTINUED | OUTPATIENT
Start: 2024-02-01 | End: 2024-02-01

## 2024-02-01 RX ORDER — POLYETHYLENE GLYCOL 3350 17 G/17G
17 POWDER, FOR SOLUTION ORAL DAILY
Status: DISCONTINUED | OUTPATIENT
Start: 2024-02-01 | End: 2024-02-03 | Stop reason: HOSPADM

## 2024-02-01 RX ORDER — ACETAMINOPHEN 500 MG
1000 TABLET ORAL EVERY 8 HOURS SCHEDULED
Status: DISCONTINUED | OUTPATIENT
Start: 2024-02-01 | End: 2024-02-03 | Stop reason: HOSPADM

## 2024-02-01 RX ORDER — SODIUM CHLORIDE 9 MG/ML
INJECTION, SOLUTION INTRAVENOUS PRN
Status: DISCONTINUED | OUTPATIENT
Start: 2024-02-01 | End: 2024-02-03 | Stop reason: HOSPADM

## 2024-02-01 RX ORDER — IBUPROFEN 800 MG/1
800 TABLET ORAL EVERY 8 HOURS SCHEDULED
Status: DISCONTINUED | OUTPATIENT
Start: 2024-02-02 | End: 2024-02-03 | Stop reason: HOSPADM

## 2024-02-01 RX ORDER — SODIUM CHLORIDE 0.9 % (FLUSH) 0.9 %
5-40 SYRINGE (ML) INJECTION PRN
Status: DISCONTINUED | OUTPATIENT
Start: 2024-02-01 | End: 2024-02-02 | Stop reason: ALTCHOICE

## 2024-02-01 RX ORDER — LANOLIN/MINERAL OIL
LOTION (ML) TOPICAL
Status: DISCONTINUED | OUTPATIENT
Start: 2024-02-01 | End: 2024-02-03 | Stop reason: HOSPADM

## 2024-02-01 RX ORDER — ONDANSETRON 2 MG/ML
4 INJECTION INTRAMUSCULAR; INTRAVENOUS EVERY 6 HOURS PRN
Status: DISCONTINUED | OUTPATIENT
Start: 2024-02-01 | End: 2024-02-03 | Stop reason: HOSPADM

## 2024-02-01 RX ORDER — TRANEXAMIC ACID 100 MG/ML
INJECTION, SOLUTION INTRAVENOUS PRN
Status: DISCONTINUED | OUTPATIENT
Start: 2024-02-01 | End: 2024-02-01 | Stop reason: SDUPTHER

## 2024-02-01 RX ORDER — ONDANSETRON 4 MG/1
4 TABLET, ORALLY DISINTEGRATING ORAL EVERY 8 HOURS PRN
Status: DISCONTINUED | OUTPATIENT
Start: 2024-02-01 | End: 2024-02-03 | Stop reason: HOSPADM

## 2024-02-01 RX ORDER — LABETALOL 200 MG/1
200 TABLET, FILM COATED ORAL EVERY 8 HOURS SCHEDULED
Status: DISCONTINUED | OUTPATIENT
Start: 2024-02-01 | End: 2024-02-03 | Stop reason: HOSPADM

## 2024-02-01 RX ORDER — SWAB
1 SWAB, NON-MEDICATED MISCELLANEOUS DAILY
Status: DISCONTINUED | OUTPATIENT
Start: 2024-02-01 | End: 2024-02-03 | Stop reason: HOSPADM

## 2024-02-01 RX ORDER — FENTANYL CITRATE 50 UG/ML
INJECTION, SOLUTION INTRAMUSCULAR; INTRAVENOUS PRN
Status: DISCONTINUED | OUTPATIENT
Start: 2024-02-01 | End: 2024-02-01

## 2024-02-01 RX ORDER — BUPIVACAINE HYDROCHLORIDE 7.5 MG/ML
INJECTION, SOLUTION EPIDURAL; RETROBULBAR PRN
Status: DISCONTINUED | OUTPATIENT
Start: 2024-02-01 | End: 2024-02-01 | Stop reason: SDUPTHER

## 2024-02-01 RX ORDER — MISOPROSTOL 200 UG/1
400 TABLET ORAL ONCE
Status: COMPLETED | OUTPATIENT
Start: 2024-02-01 | End: 2024-02-01

## 2024-02-01 RX ORDER — MORPHINE SULFATE 1 MG/ML
INJECTION, SOLUTION EPIDURAL; INTRATHECAL; INTRAVENOUS PRN
Status: DISCONTINUED | OUTPATIENT
Start: 2024-02-01 | End: 2024-02-01 | Stop reason: SDUPTHER

## 2024-02-01 RX ORDER — OXYTOCIN 10 [USP'U]/ML
INJECTION, SOLUTION INTRAMUSCULAR; INTRAVENOUS PRN
Status: DISCONTINUED | OUTPATIENT
Start: 2024-02-01 | End: 2024-02-01 | Stop reason: SDUPTHER

## 2024-02-01 RX ORDER — SODIUM CHLORIDE 0.9 % (FLUSH) 0.9 %
5-40 SYRINGE (ML) INJECTION EVERY 12 HOURS SCHEDULED
Status: DISCONTINUED | OUTPATIENT
Start: 2024-02-01 | End: 2024-02-02 | Stop reason: ALTCHOICE

## 2024-02-01 RX ORDER — OXYCODONE HYDROCHLORIDE 5 MG/1
5 TABLET ORAL EVERY 4 HOURS PRN
Status: DISCONTINUED | OUTPATIENT
Start: 2024-02-01 | End: 2024-02-03 | Stop reason: HOSPADM

## 2024-02-01 RX ORDER — FAMOTIDINE 10 MG/ML
INJECTION, SOLUTION INTRAVENOUS PRN
Status: DISCONTINUED | OUTPATIENT
Start: 2024-02-01 | End: 2024-02-01 | Stop reason: SDUPTHER

## 2024-02-01 RX ORDER — FENTANYL CITRATE 50 UG/ML
INJECTION, SOLUTION INTRAMUSCULAR; INTRAVENOUS PRN
Status: DISCONTINUED | OUTPATIENT
Start: 2024-02-01 | End: 2024-02-01 | Stop reason: SDUPTHER

## 2024-02-01 RX ORDER — KETOROLAC TROMETHAMINE 30 MG/ML
30 INJECTION, SOLUTION INTRAMUSCULAR; INTRAVENOUS EVERY 6 HOURS PRN
Status: DISPENSED | OUTPATIENT
Start: 2024-02-01 | End: 2024-02-02

## 2024-02-01 RX ORDER — PROCHLORPERAZINE EDISYLATE 5 MG/ML
5 INJECTION INTRAMUSCULAR; INTRAVENOUS ONCE
Status: COMPLETED | OUTPATIENT
Start: 2024-02-01 | End: 2024-02-01

## 2024-02-01 RX ORDER — DOCUSATE SODIUM 100 MG/1
100 CAPSULE, LIQUID FILLED ORAL 2 TIMES DAILY
Status: DISCONTINUED | OUTPATIENT
Start: 2024-02-01 | End: 2024-02-03 | Stop reason: HOSPADM

## 2024-02-01 RX ORDER — PHENYLEPHRINE HCL IN 0.9% NACL 0.4MG/10ML
SYRINGE (ML) INTRAVENOUS PRN
Status: DISCONTINUED | OUTPATIENT
Start: 2024-02-01 | End: 2024-02-01 | Stop reason: SDUPTHER

## 2024-02-01 RX ADMIN — DOCUSATE SODIUM 100 MG: 100 CAPSULE, LIQUID FILLED ORAL at 10:46

## 2024-02-01 RX ADMIN — SODIUM CHLORIDE, POTASSIUM CHLORIDE, SODIUM LACTATE AND CALCIUM CHLORIDE: 600; 310; 30; 20 INJECTION, SOLUTION INTRAVENOUS at 17:41

## 2024-02-01 RX ADMIN — LABETALOL HYDROCHLORIDE 20 MG: 5 INJECTION, SOLUTION INTRAVENOUS at 13:00

## 2024-02-01 RX ADMIN — PROCHLORPERAZINE EDISYLATE 5 MG: 5 INJECTION INTRAMUSCULAR; INTRAVENOUS at 14:22

## 2024-02-01 RX ADMIN — CEFAZOLIN SODIUM 2000 MG: 1 POWDER, FOR SOLUTION INTRAMUSCULAR; INTRAVENOUS at 09:23

## 2024-02-01 RX ADMIN — SODIUM CHLORIDE, POTASSIUM CHLORIDE, SODIUM LACTATE AND CALCIUM CHLORIDE: 600; 310; 30; 20 INJECTION, SOLUTION INTRAVENOUS at 09:31

## 2024-02-01 RX ADMIN — MORPHINE SULFATE 0.2 MG: 1 INJECTION, SOLUTION EPIDURAL; INTRATHECAL; INTRAVENOUS at 09:16

## 2024-02-01 RX ADMIN — ACETAMINOPHEN 1000 MG: 500 TABLET ORAL at 10:46

## 2024-02-01 RX ADMIN — Medication 40 MCG: at 09:36

## 2024-02-01 RX ADMIN — MISOPROSTOL 400 MCG: 200 TABLET ORAL at 09:44

## 2024-02-01 RX ADMIN — SODIUM CHLORIDE, POTASSIUM CHLORIDE, SODIUM LACTATE AND CALCIUM CHLORIDE: 600; 310; 30; 20 INJECTION, SOLUTION INTRAVENOUS at 12:12

## 2024-02-01 RX ADMIN — ONDANSETRON 4 MG: 2 INJECTION INTRAMUSCULAR; INTRAVENOUS at 09:10

## 2024-02-01 RX ADMIN — SODIUM CHLORIDE, POTASSIUM CHLORIDE, SODIUM LACTATE AND CALCIUM CHLORIDE: 600; 310; 30; 20 INJECTION, SOLUTION INTRAVENOUS at 07:28

## 2024-02-01 RX ADMIN — SODIUM CHLORIDE, POTASSIUM CHLORIDE, SODIUM LACTATE AND CALCIUM CHLORIDE: 600; 310; 30; 20 INJECTION, SOLUTION INTRAVENOUS at 08:25

## 2024-02-01 RX ADMIN — Medication 40 MCG: at 09:20

## 2024-02-01 RX ADMIN — Medication 40 MCG: at 09:22

## 2024-02-01 RX ADMIN — TRANEXAMIC ACID 1000 MG: 100 INJECTION, SOLUTION INTRAVENOUS at 09:41

## 2024-02-01 RX ADMIN — OXYTOCIN 30 UNITS: 10 INJECTION INTRAVENOUS at 09:41

## 2024-02-01 RX ADMIN — DOCUSATE SODIUM 100 MG: 100 CAPSULE, LIQUID FILLED ORAL at 22:02

## 2024-02-01 RX ADMIN — BUPIVACAINE HYDROCHLORIDE 1.4 ML: 7.5 INJECTION, SOLUTION EPIDURAL; RETROBULBAR at 09:16

## 2024-02-01 RX ADMIN — FAMOTIDINE 20 MG: 10 INJECTION, SOLUTION INTRAVENOUS at 09:10

## 2024-02-01 RX ADMIN — ONDANSETRON 4 MG: 2 INJECTION INTRAMUSCULAR; INTRAVENOUS at 12:59

## 2024-02-01 RX ADMIN — Medication 80 MCG: at 09:27

## 2024-02-01 RX ADMIN — OXYCODONE 5 MG: 5 TABLET ORAL at 15:12

## 2024-02-01 RX ADMIN — FENTANYL CITRATE 10 MCG: 50 INJECTION, SOLUTION INTRAMUSCULAR; INTRAVENOUS at 09:16

## 2024-02-01 RX ADMIN — SODIUM CHLORIDE, PRESERVATIVE FREE 10 ML: 5 INJECTION INTRAVENOUS at 11:12

## 2024-02-01 RX ADMIN — KETOROLAC TROMETHAMINE 30 MG: 30 INJECTION, SOLUTION INTRAMUSCULAR; INTRAVENOUS at 11:08

## 2024-02-01 ASSESSMENT — PAIN DESCRIPTION - LOCATION
LOCATION: INCISION
LOCATION: INCISION

## 2024-02-01 ASSESSMENT — PAIN DESCRIPTION - ORIENTATION
ORIENTATION: ANTERIOR
ORIENTATION: ANTERIOR;LOWER

## 2024-02-01 ASSESSMENT — PAIN DESCRIPTION - DESCRIPTORS
DESCRIPTORS: DULL
DESCRIPTORS: DULL

## 2024-02-01 ASSESSMENT — PAIN SCALES - GENERAL
PAINLEVEL_OUTOF10: 2
PAINLEVEL_OUTOF10: 2

## 2024-02-01 NOTE — PROGRESS NOTES
1700 Patient arrived from [facility] to monitor BP and prep for c/s.  EFM applied.  Vital signs stable. IV started and labs sent.  Patient denies LOF or vaginal bleeding, endorses +FM. Denies headache, visual changes, epigastric pain.      1910 SBAR given to JASON Rojas RN.

## 2024-02-01 NOTE — PROGRESS NOTES
[address] [place] [address]   Office [phone], Fax [phone]     Antepartum Progress Note    Name: Ariella Church MRN: [id number]  SSN: [id number]    YOB: 2000  Age: 23 y.o.  Sex: female      Subjective:      LOS: 1 day    Estimated Date of Delivery: 24   Gestational Age Today: 37w2d     Patient admitted for newly diagnosed gestational HTN. States she does not have headache , nausea and vomiting, right upper quadrant pain  , shortness of breath, swelling, and visual disturbances.    Objective:     Vitals:  Blood pressure 134/82, pulse (!) 107, temperature 98.2 °F (36.8 °C), temperature source Oral, resp. rate 17, height 1.525 m (5' 0.04\"), weight 83.9 kg (185 lb), last menstrual period 2023, SpO2 98 %.   Temp (24hrs), Av.9 °F (36.6 °C), Min:97.2 °F (36.2 °C), Max:98.3 °F (36.8 °C)    Systolic (24hrs), Av , Min:127 , Max:154      Diastolic (24hrs), Av, Min:68, Max:100       Intake and Output:     Date 24 0000 - 24 2359   Shift 1531-5309 9313-2429 6295-7311 24 Hour Total   INTAKE   I.V.(mL/kg) 421.1(5) 1500(17.9)  1921.1(22.9)   IV Piggyback(mL/kg) 897.3(10.7)   897.3(10.7)   Shift Total(mL/kg) 1318.4(15.7) 1500(17.9)  2818.4(33.6)   OUTPUT   Urine(mL/kg/hr)  200  200   Shift Total(mL/kg)  200(2.4)  200(2.4)   Weight (kg) 83.9 83.9 83.9 83.9       Physical Exam:  GEN: No apparent distress. Alert and oriented and responds to all questions appropriately.      LUNGS: Respirations unlabored; clear to auscultation bilaterally  CARDIOVASCULAR: Regular, rate, and rhythm without murmurs, gallops or rubs   ABDOMEN: Soft; nontender; nondistended; normoactive bowel sounds; no masses or organomegaly  NEUROLOGIC:  No focal neurologic deficits.   EXT: Well perfused. No edema.  SKIN: No obvious rashes.    Membranes:  Intact  Fetal Heart Rate:  Reactive        Labs:   Recent Results (from the past 24 hour(s))   CBC    Collection Time: 24   transaminases, and creatinine WNL. No severe range pressures. Antihypertensives not required at this time. Plan for delivery, repeat LTCS.    today: pt desires repeat. Primary d/t breech presentation. 2g Ancef prior to surgery.     Patient discussed with Dr. Thomas      Signed By: Kandace Gonzalez DO    Family Medicine Resident

## 2024-02-01 NOTE — LACTATION NOTE
This note was copied from a baby's chart.  LC in to see mother. She was nauseated and not feeling well. Encouraged her to sleep. Handouts left at bedside.

## 2024-02-01 NOTE — PLAN OF CARE
Problem: Pain  Goal: Verbalizes/displays adequate comfort level or baseline comfort level  Outcome: Progressing     Problem: Safety - Adult  Goal: Free from fall injury  Outcome: Progressing      Problem: Postpartum  Goal: Experiences normal postpartum course  Description:  Postpartum OB-Pregnancy care plan goal which identifies if the mother is experiencing a normal postpartum course  Outcome: Progressing     Problem: Postpartum  Goal: Appropriate maternal -  bonding  Description:  Postpartum OB-Pregnancy care plan goal which identifies if the mother and  are bonding appropriately  Outcome: Progressing     Problem: Postpartum  Goal: Establishment of infant feeding pattern  Description:  Postpartum OB-Pregnancy care plan goal which identifies if the mother is establishing a feeding pattern with their   Outcome: Progressing     Problem: Postpartum  Goal: Incisions, wounds, or drain sites healing without S/S of infection  Outcome: Progressing     Problem: Chronic Conditions and Co-morbidities  Goal: Patient's chronic conditions and co-morbidity symptoms are monitored and maintained or improved  Outcome: Progressing

## 2024-02-01 NOTE — PROGRESS NOTES
I saw and evaluated the patient, performing the key elements of the service.  I discussed the findings, assessment and plan with the resident and agree with the resident's findings and plan as documented in the resident's note.    See (pending) resident daily progress note for details.      Repeat section for PreE without severe features at 37w.  Pt denies HA, vision changes, RUQ pain.  Bps are normal to mild range.  Hbg 12.2, plt 216.  I have counseled the patient on R/B/A of section including but not limited to bleeding, infection, thrombosis, injury to bladder, bowel or other surrounding organs.  She accepts and desires to proceed.  OBH to assist.

## 2024-02-01 NOTE — ANESTHESIA PRE PROCEDURE
Department of Anesthesiology  Preprocedure Note       Name:  Ariella Church   Age:  23 y.o.  :  2000                                          MRN:  [id number]         Date:  2024      Surgeon: Surgeon(s):  Nawaf Thomas DO    Procedure: Procedure(s):   SECTION    Medications prior to admission:   Prior to Admission medications    Medication Sig Start Date End Date Taking? Authorizing Provider   aspirin 81 MG EC tablet Take 1 tablet by mouth daily    ProviderAlexei MD   Prenatal MV-Min-Fe Fum-FA-DHA (PRENATAL 1 PO) Take by mouth    ProviderAlexei MD       Current medications:    Current Facility-Administered Medications   Medication Dose Route Frequency Provider Last Rate Last Admin    lactated ringers bolus bolus 1,000 mL  1,000 mL IntraVENous Once Viet Leija MD        sodium chloride flush 0.9 % injection 5-40 mL  5-40 mL IntraVENous 2 times per day Viet Leija MD        sodium chloride flush 0.9 % injection 10 mL  10 mL IntraVENous PRN Viet Leija MD        0.9 % sodium chloride infusion   IntraVENous PRN Viet Leija MD        famotidine (PEPCID) 20 mg in sodium chloride (PF) 0.9 % 10 mL injection  20 mg IntraVENous Once Viet Leija MD        acetaminophen (TYLENOL) tablet 975 mg  975 mg Oral Once Viet Leija MD        oxytocin (PITOCIN) 30 units in 500 mL infusion  87.3 malathi-units/min IntraVENous Continuous PRN Viet Leija MD        And    oxytocin (PITOCIN) 10 unit bolus from the bag  10 Units IntraVENous PRN Viet Leija MD        ondansetron (ZOFRAN) injection 4 mg  4 mg IntraVENous Q6H PRN Viet Leija MD        lactated ringers IV soln infusion   IntraVENous Continuous Viet Leija MD           Allergies:  No Known Allergies    Problem List:    Patient Active Problem List   Diagnosis Code    History of  delivery Z98.891    Obesity affecting pregnancy, antepartum O99.210    Gestational hypertension affecting third pregnancy O13.9       Past Medical History:

## 2024-02-01 NOTE — OP NOTE
Operative Note    Name: Ariella Church   Medical Record Number: [id number]   YOB: 2000  Today's Date: 2024      Pre-operative Diagnosis:   IUP @ 37w2d  Preeclampsia without severe features  History of      Post-operative Diagnosis:   Same    Procedure: Low transverse  section via pfannenstiel    Surgeon(s):  Nawaf Thomas DO    Assistant:   Elie Be MD    Anesthesia: Spinal    Complications: none    UOP: 200cc clear    EBL: 900cc    IVF: 1500cc LR     Indications: 23 y.o.  at 37w2d who presents with elevated blood pressure, ruled in for preeclampsia without severe features.  We proceeded to  due to history of  and patient desired repeat.    Findings: oliva     Birth Information:   Information for the patient's :  Bryson Church, Aishwarya Krishnan [[id number]]   @[id number]@            Complications:  None    Procedure:      After informed consent was obtained, the patient was taken to the operating room where spinal anesthesia was administered and found to be adequate. She was then prepped and draped in the normal sterile fashion in a dorsal supine position with a leftward tilt. A Pfannenstiel skin incision was then made with the scalpel and carried through to the underlying layer of fascia. The fascia was incised in the midline and the incision extended laterally with the Estrada scissors.  The superior aspect of the fascia was grasped with the Kocher clamps and the rectus muscle dissected off bluntly and with the Denny.  The inferior aspect was, in a similar fashion, grasped with the Kocher clamps and the rectus muscle dissected off bluntly and sharply.  The rectus muscles were then  in the midline and the peritoneum identified and entered bluntly.  This incision was then extended laterally.      The bladder blade was then inserted, a bladder flap created in usual fashion and the lower uterine segment  incised in a transverse fashion with the scalpel.  The uterine incision was then bluntly extended.  The bladder blade was removed and the infant's head delivered atraumatically followed by the body.  The cord was clamped, and the baby was handed off and cord blood was collected.    The placenta was then removed by uterine massage.  The uterus was exteriorized and cleared of all clots and debris.  Due to brisk bleeding and some uterine atony the patient was given TXA and oral misoprostol.  The uterine incision was repaired with 0 monocryl in a running locked fashion.  A second layer of the same suture was used to obtain excellent hemostasis.  The gutters were cleared of all clots and the uterus was again inspected for hemostasis.  The fascia was re-approximated with 0 vicryl in a running fashion.  The area was irrigated.  The subcuticular space was reapproximated with 2-0 monocryl and the skin was closed with 3-0 monocryl on a Kyler needle in a running subcuticular fashion.      The patient tolerate the procedure well.  Sponge, lap and instrument counts were correct times 2.  Two grams of Ancef was given before the procedure.  The patient was taken to the recovery room in stable condition.        Nawaf Thomas DO  02/01/24  10:34 AM

## 2024-02-01 NOTE — ANESTHESIA PROCEDURE NOTES
Spinal Block    Patient location during procedure: OR  End time: 2/1/2024 9:16 AM  Reason for block: post-op pain management, primary anesthetic and at surgeon's request  Staffing  Performed: resident/CRNA   Resident/CRNA: June Mcmahon APRN - CRNA  Performed by: June Mcmahon APRN - CRNA  Authorized by: Ignacia Lima MD    Spinal Block  Patient position: sitting  Prep: DuraPrep and site prepped and draped  Patient monitoring: cardiac monitor, continuous pulse ox, continuous capnometry, frequent blood pressure checks and oxygen  Approach: midline  Location: L3/L4  Provider prep: mask and sterile gloves  Local infiltration: lidocaine  Needle  Needle type: Pencan   Needle gauge: 25 G  Assessment  Sensory level: T4  Swirl obtained: Yes  CSF: clear  Attempts: 1  Hemodynamics: stable  Additional Notes  Easily placed on first pass; neg heme, neg paresthesia  Preanesthetic Checklist  Completed: patient identified, IV checked, site marked, risks and benefits discussed, surgical/procedural consents, pre-op evaluation, timeout performed, anesthesia consent given, oxygen available and monitors applied/VS acknowledged

## 2024-02-01 NOTE — PROGRESS NOTES
Care assumed at this time, pt seen resting in bed, EFMx2 adjusted    1940 vitals taken. Assessment done, NST reactive, pt off monitor per Dr Umanzor    2/1 0130 pt seen resting in bed, NST reactive, BP taken

## 2024-02-01 NOTE — PROGRESS NOTES
0720 Assumed care of patient.  services in use. Denies pain/discomforts. +fetal movement. No vaginal bleeding. Denies H/A, visual disturbances, N/V or epigastric pain. Significant other at bedside. Pre op education provided.    0907 Ambulating to OR at this time with no complaints.    1020 Returned to room via stretcher at this time with no complaints.    1148 Family medicine resident notified or recent BP readings. Patient denies H/A, visual changes or N/V.    1300 Patient with vomiting x2, liquid emesis. Unable to tolerate po medications. Order per ENID Gonzalez, may give zofran now and give 1st dose of labetalol IV.    1410 Continues with nausea and vomiting. New order received per Md.    1450 Patient reports nausea improved.

## 2024-02-02 LAB
ERYTHROCYTE [DISTWIDTH] IN BLOOD BY AUTOMATED COUNT: 16 % (ref 11.5–14.5)
HCT VFR BLD AUTO: 28.1 % (ref 35–47)
HGB BLD-MCNC: 9.5 G/DL (ref 11.5–16)
MCH RBC QN AUTO: 29.1 PG (ref 26–34)
MCHC RBC AUTO-ENTMCNC: 33.8 G/DL (ref 30–36.5)
MCV RBC AUTO: 86.2 FL (ref 80–99)
NRBC # BLD: 0 K/UL (ref 0–0.01)
NRBC BLD-RTO: 0 PER 100 WBC
PLATELET # BLD AUTO: 195 K/UL (ref 150–400)
PMV BLD AUTO: 11.2 FL (ref 8.9–12.9)
RBC # BLD AUTO: 3.26 M/UL (ref 3.8–5.2)
WBC # BLD AUTO: 10.1 K/UL (ref 3.6–11)

## 2024-02-02 PROCEDURE — 36415 COLL VENOUS BLD VENIPUNCTURE: CPT

## 2024-02-02 PROCEDURE — 99232 SBSQ HOSP IP/OBS MODERATE 35: CPT | Performed by: OBSTETRICS & GYNECOLOGY

## 2024-02-02 PROCEDURE — 6370000000 HC RX 637 (ALT 250 FOR IP): Performed by: FAMILY MEDICINE

## 2024-02-02 PROCEDURE — 6370000000 HC RX 637 (ALT 250 FOR IP)

## 2024-02-02 PROCEDURE — 85027 COMPLETE CBC AUTOMATED: CPT

## 2024-02-02 PROCEDURE — 1120000000 HC RM PRIVATE OB

## 2024-02-02 RX ORDER — IBUPROFEN 800 MG/1
800 TABLET ORAL EVERY 8 HOURS PRN
Status: CANCELLED | OUTPATIENT
Start: 2024-02-02

## 2024-02-02 RX ADMIN — DOCUSATE SODIUM 100 MG: 100 CAPSULE, LIQUID FILLED ORAL at 09:10

## 2024-02-02 RX ADMIN — ACETAMINOPHEN 1000 MG: 500 TABLET ORAL at 06:03

## 2024-02-02 RX ADMIN — IBUPROFEN 800 MG: 800 TABLET, FILM COATED ORAL at 20:03

## 2024-02-02 RX ADMIN — Medication 1 TABLET: at 09:10

## 2024-02-02 RX ADMIN — LABETALOL HYDROCHLORIDE 200 MG: 200 TABLET, FILM COATED ORAL at 13:51

## 2024-02-02 RX ADMIN — POLYETHYLENE GLYCOL 3350 17 G: 17 POWDER, FOR SOLUTION ORAL at 09:12

## 2024-02-02 ASSESSMENT — PAIN SCALES - GENERAL
PAINLEVEL_OUTOF10: 2
PAINLEVEL_OUTOF10: 2

## 2024-02-02 ASSESSMENT — PAIN DESCRIPTION - DESCRIPTORS
DESCRIPTORS: SORE
DESCRIPTORS: SORE

## 2024-02-02 ASSESSMENT — PAIN DESCRIPTION - LOCATION
LOCATION: ABDOMEN
LOCATION: ABDOMEN

## 2024-02-02 ASSESSMENT — PAIN DESCRIPTION - ORIENTATION: ORIENTATION: LOWER

## 2024-02-02 NOTE — LACTATION NOTE
This note was copied from a baby's chart.  Experienced breastfeeding mother. She breast fed her first baby for one year.     Discussed with mother her plan for feeding.  Reviewed the benefits of exclusive breast milk feeding during the hospital stay.   Informed her of the risks of using formula to supplement in the first few days of life as well as the benefits of successful breast milk feeding; referred her to the Breastfeeding booklet about this information.   She acknowledges understanding of information reviewed and states that it is her plan to breast/formula feed her infant.  Will support her choice and offer additional information as needed.     Mother  will successfully establish breastfeeding by feeding in response to early feeding cues   or wake every 3h, will obtain deep latch, and will keep log of feedings/output.  Taught to BF at hunger cues and or q 2-3 hrs and to offer 10-20 drops of hand expressed colostrum at any non-feeds.      Left Breast: Soft  Left Nipple: Protrude  Right Nipple: Protrude  Right Breast: Soft  Position and Latch: Independently            Formula Type: Similac 360 Total Care      Breast Care: Lanolin provided, Nursing pads, Pumping supply provided     Lactation Comment: Baby last breast fed at 10:30 for 30 minutes then took 28 ml of formula. Encouraged mother to call LC for breastfeeding assistance. Breastfeeding handouts, LC# and supplies given.

## 2024-02-02 NOTE — DISCHARGE SUMMARY
[address] [place] [address]   Office [phone], Fax [phone]    Obstetrical Discharge Summary     Name: Ariella Church MRN: [id number]  SSN: [id number]    YOB: 2000  Age: 23 y.o.  Sex: female      Admit Date: 2024    Discharge Date: 2/3/2024     Admitting Physician: Nawaf Thomas DO     Attending Physician:  Sujit Rodriguez MD    Admission Diagnoses: Previous  delivery, delivered [O34.219]  Gestational hypertension affecting third pregnancy [O13.9]    Discharge Diagnoses:   Information for the patient's :  Bryson Church, Aishwarya Krishnan [[id number]]   @[id number]@      Additional Diagnoses:  No components found for: \"OBEXTABORH\", \"OBEXTABSCRN\", \"OBEXTRUBELLA\", \"OBEXTGRBS\"    Immunization(s):   Immunization History   Administered Date(s) Administered    Influenza, FLUCELVAX, (age 6 mo+), MDCK, PF, 0.5mL 10/06/2023    TDaP, ADACEL (age 10y-64y), BOOSTRIX (age 10y+), IM, 0.5mL 2023        Hospital Course:   Patient is a 23 y.o.  s/p rLTCS at 37 weeks 2 days for gHTN. Pregnancy complicated by gHTN, obesity, h/o CS d/t breech presentation.     Presented from [facility] appointment for elevated BP. Diagnosed with pre-eclampsia after PCr 1.3, no severe features. Received 1 dose of IV labetalol.Delivered TLMI by rLTCS, complicated by PPH with QBL of 1022mL. Received 1 dose of Misoprostol.     Normal hospital course following the delivery.  Remained normotensive for the majority of her postpartum stay. On day of discharge patient reported minimal lochia, well controlled pain, and no other complaints.  Discharged with pain regimen and bowel regimen.  Advised to continue prenatal vitamins.      Plan to discharge with BP cuff.   Heart rate irregular on exam, EKG ordered:     Follow up with Dr. Charles in 1 week for blood pressure check. Follow up with Dr. Chauhan in 2 weeks for incision check.      Future Appointments   Date Time Provider  Department Center   2024  9:20 AM Richard Charles MD FP BS AMB   2/15/2024  9:40 AM Eddi Chauhan MD [facility] BS AMB          Depression Scale  Postpartum Depression: Low Risk  (2/3/2024)    [place]  Depression Scale     Last EPDS Total Score: 0     Last EPDS Self Harm Result: Never        Follow up test at discharge:   Condition at Discharge:  stable  Disposition: Discharge to Home    Physical exam:  /86   Pulse 67   Temp 98.1 °F (36.7 °C) (Oral)   Resp 20   Ht 1.525 m (5' 0.04\")   Wt 83.9 kg (185 lb)   LMP 2023   SpO2 100%   Breastfeeding Unknown   BMI 36.08 kg/m²     Exam:  Patient without distress.               CTAB, no w/r/r/c               Irregular rhythm, + S1 and S2, no m/r/g               Abdomen soft, fundus firm at level of umbilicus, non tender               Perineum with normal lochia noted.               Lower extremities are negative for swelling, cords or tenderness.      Patient Instructions:      Medication List        START taking these medications      docusate 100 MG Caps  Commonly known as: COLACE, DULCOLAX  Take 100 mg by mouth 2 times daily     HYDROcodone-acetaminophen 5-325 MG per tablet  Commonly known as: Norco  Take 1 tablet by mouth every 8 hours as needed for Pain for up to 10 days. Intended supply: 5 days. Take lowest dose possible to manage pain Max Daily Amount: 3 tablets     ibuprofen 800 MG tablet  Commonly known as: ADVIL;MOTRIN  Take 1 tablet by mouth every 8 hours            CONTINUE taking these medications      PRENATAL 1 PO            STOP taking these medications      aspirin 81 MG EC tablet               Where to Get Your Medications        These medications were sent to [facility] [address] [place] [address] - [address] - P [phone] - F [phone] [address] [facility] [address]      Phone: [phone]   docusate 100 MG Caps  HYDROcodone-acetaminophen 5-325 MG per tablet  ibuprofen 800 MG tablet

## 2024-02-02 NOTE — PROGRESS NOTES
[address] [place] [address]   Office [phone], Fax [phone]                                Post-Operative Day Number 1 Progress Note    Patient doing well post-partum without significant complaint.  Pain well controlled.  Lochia normal.  Tolerating normal diet.  Ambulating.  Voiding without difficulty.      Vitals:  Patient Vitals for the past 8 hrs:   BP Temp Temp src Pulse Resp SpO2   24 0816 113/65 97.9 °F (36.6 °C) Oral 78 16 97 %   24 0646 (!) 107/53 -- -- 70 -- --   24 0542 (!) 121/90 97.5 °F (36.4 °C) Oral 87 12 97 %   24 0306 110/66 97.7 °F (36.5 °C) Oral 89 12 97 %     Temp (24hrs), Av.4 °F (36.9 °C), Min:97.5 °F (36.4 °C), Max:99.7 °F (37.6 °C)      Exam:  Patient without distress.            Abdomen soft, fundus firm at level of umbilicus, nontender.               Perineum with normal lochia noted.               Lower extremities:  No edema. No palpable cords or tenderness.    Lab/Data Review:  Recent Results (from the past 12 hour(s))   CBC    Collection Time: 24  5:32 AM   Result Value Ref Range    WBC 10.1 3.6 - 11.0 K/uL    RBC 3.26 (L) 3.80 - 5.20 M/uL    Hemoglobin 9.5 (L) 11.5 - 16.0 g/dL    Hematocrit 28.1 (L) 35.0 - 47.0 %    MCV 86.2 80.0 - 99.0 FL    MCH 29.1 26.0 - 34.0 PG    MCHC 33.8 30.0 - 36.5 g/dL    RDW 16.0 (H) 11.5 - 14.5 %    Platelets 195 150 - 400 K/uL    MPV 11.2 8.9 - 12.9 FL    Nucleated RBCs 0.0 0  WBC    nRBC 0.00 0.00 - 0.01 K/uL         Assessment and Plan:    Ariella Church is a 23 y.o.  s/p rLTCS (primary for breech presentation) at 37w2d 2/2 pre-eclampsia. Pregnancy was complicated by gHTN diagnosed after 37 weeks gestation, then diagnosed with pre-eclampsia. Received 1 dose of labetalol 20mg IV yesterday afternoon, pressures have been normotensive since then. No antihypertensives administered since.     Continue to monitor pressures. Pt has labetalol 200mg TID ordered, but hold if BP  <120/80.   Continue routine perineal care and maternal education.    Plan discharge tomorrow or the day after if no problems occur. Will need outpatient f/u for blood pressure next week.     Patient discussed with Dr. Betts.                 Kandace Gonzalez DO  Family Medicine Resident

## 2024-02-03 VITALS
HEIGHT: 60 IN | TEMPERATURE: 97.9 F | BODY MASS INDEX: 36.32 KG/M2 | DIASTOLIC BLOOD PRESSURE: 78 MMHG | HEART RATE: 81 BPM | WEIGHT: 185 LBS | OXYGEN SATURATION: 100 % | RESPIRATION RATE: 20 BRPM | SYSTOLIC BLOOD PRESSURE: 122 MMHG

## 2024-02-03 PROCEDURE — 93005 ELECTROCARDIOGRAM TRACING: CPT

## 2024-02-03 PROCEDURE — 6370000000 HC RX 637 (ALT 250 FOR IP)

## 2024-02-03 PROCEDURE — 6370000000 HC RX 637 (ALT 250 FOR IP): Performed by: FAMILY MEDICINE

## 2024-02-03 RX ORDER — HYDROCODONE BITARTRATE AND ACETAMINOPHEN 5; 325 MG/1; MG/1
1 TABLET ORAL EVERY 8 HOURS PRN
Qty: 20 TABLET | Refills: 0 | Status: SHIPPED | OUTPATIENT
Start: 2024-02-03 | End: 2024-02-13

## 2024-02-03 RX ORDER — PSEUDOEPHEDRINE HCL 30 MG
100 TABLET ORAL 2 TIMES DAILY
Qty: 60 CAPSULE | Refills: 0 | Status: SHIPPED | OUTPATIENT
Start: 2024-02-03

## 2024-02-03 RX ORDER — IBUPROFEN 800 MG/1
800 TABLET ORAL EVERY 8 HOURS SCHEDULED
Qty: 30 TABLET | Refills: 0 | Status: SHIPPED | OUTPATIENT
Start: 2024-02-03

## 2024-02-03 RX ADMIN — ACETAMINOPHEN 1000 MG: 500 TABLET ORAL at 15:46

## 2024-02-03 RX ADMIN — IBUPROFEN 800 MG: 800 TABLET, FILM COATED ORAL at 15:43

## 2024-02-03 RX ADMIN — Medication 1 TABLET: at 15:43

## 2024-02-03 RX ADMIN — DOCUSATE SODIUM 100 MG: 100 CAPSULE, LIQUID FILLED ORAL at 15:46

## 2024-02-03 RX ADMIN — LABETALOL HYDROCHLORIDE 200 MG: 200 TABLET, FILM COATED ORAL at 06:43

## 2024-02-03 RX ADMIN — IBUPROFEN 800 MG: 800 TABLET, FILM COATED ORAL at 06:43

## 2024-02-03 RX ADMIN — ACETAMINOPHEN 1000 MG: 500 TABLET ORAL at 03:56

## 2024-02-03 ASSESSMENT — PAIN DESCRIPTION - ORIENTATION
ORIENTATION: LOWER
ORIENTATION: LOWER

## 2024-02-03 ASSESSMENT — PAIN SCALES - GENERAL
PAINLEVEL_OUTOF10: 4
PAINLEVEL_OUTOF10: 2
PAINLEVEL_OUTOF10: 2

## 2024-02-03 ASSESSMENT — PAIN DESCRIPTION - DESCRIPTORS
DESCRIPTORS: SORE
DESCRIPTORS: SORE

## 2024-02-03 ASSESSMENT — PAIN DESCRIPTION - LOCATION
LOCATION: ABDOMEN
LOCATION: ABDOMEN

## 2024-02-03 NOTE — DISCHARGE INSTRUCTIONS
Patient Discharge Instructions    Ariella Massey Ranjit / [id number] : 2000    Admitted 2024 Discharged: 2/3/2024       Por favor tenga dany documento presente en anand smooth de seguimiento con anand médico primario.    Primary care provider:  No primary care provider on file.      Discharging provider:  Stephanie Graves MD  - Family Medicine Resident            Diágnostico de Admisión:  Previous  delivery, delivered [O34.219]  Gestational hypertension affecting third pregnancy [O13.9]    [unfilled]  RECOMENDACIONES DE CUIDADO:     Future Appointments   Date Time Provider Department Center   2024  9:20 AM Richard Charles MD [facility] BS [facility]   2/15/2024  9:40 AM Eddi Chauhan MD [facility] BS AMB     Richard Charles MD  [address] [place], [place] [phone]    Go on 2024  Your appointment is at 9:20am for BP check.    Eddi Chauhan MD  [address] [place] [phone]    Go on 2/15/2024  Your appointment is at 9:40 for incision check.        Continue Tratamiento:  - Por favor continue Motrin 800 mg, 1 tableta cada 8 horas por los próximos 2 días, luego 1 tableta cada 8 horas mientras sea necesario para el dolor.  - Por favor continue Norco 5-325 mg, tome 1 tableta cada 4 horas mientras sea necesario para el dolor.  - Por favor continue Colace 100 mg para el estreñimiento, tome 1 tableta dos veces al día hasta que pueda defercar regularmente sin necesidad del medicamento.  - Por favor continue tomando bree vitaminas prenatales.      Pruebas de seguimiento que necesita: none    Resultados pendientes:   En el momento de anand de dave los resultados de las siguientes pruebas están pendiente:  none.   Por favor discuta estos resultados con anand proveedor primario en anand smooth de seguimiento.     Importante que monitoreé los siguientes síntomas: dolor de pecho, falta de aire, fiebre, escalofríos, náusea, vómito, diarrea, cambios en anand estado mental, caídas, debilidad y

## 2024-02-04 LAB
EKG ATRIAL RATE: 72 BPM
EKG DIAGNOSIS: NORMAL
EKG P AXIS: 35 DEGREES
EKG P-R INTERVAL: 138 MS
EKG Q-T INTERVAL: 390 MS
EKG QRS DURATION: 72 MS
EKG QTC CALCULATION (BAZETT): 427 MS
EKG R AXIS: 56 DEGREES
EKG T AXIS: 41 DEGREES
EKG VENTRICULAR RATE: 72 BPM

## 2024-02-04 PROCEDURE — 93010 ELECTROCARDIOGRAM REPORT: CPT | Performed by: SPECIALIST

## 2024-02-06 NOTE — PROGRESS NOTES
[address] [id number]   Office [phone], Fax [phone]    Subjective:     History provided by patient       1 week  incision check and Post Partum Note    23 y.o. female , presenting for postpartum visit s/p Patient is a 23 y.o.  s/p rLTCS at 37 weeks 2 days for gHTN. Pregnancy complicated by gHTN, obesity, h/o CS d/t breech presentation.  Diagnosed with pre-eclampsia after PCr 1.3, no severe features. Received 1 dose of IV labetalol .  Remained normotensive for the majority of her postpartum stay.  Patient doing well post-partum without significant complaint.      Lochia: normal  Pain: controlled  Baby: doing well, has seen PCP  Sexual activity:  Plan for contraception:  Breast/bottle:  Support from FOB/family:   Symptoms of depression:  EDPS score: ***  <8= depression not likely continue support   9-11= depression possible, support and re-screen in 2-4 weeks  12-13= high possibility of depression, monitor, support and offer education  14 and higher= probable depression, treat     SocHx.   - Denies smoking, alcohol use, illicit drug use  - Sexually active:   - Occupation:     Review of Systems        Objective:     There were no vitals filed for this visit.        Exam:  Patient without distress.    Abdomen soft, fundus firm at level of umbilicus, nontender.    ***Skin: low transverse  incision is clean dry and intact.  No sign of infection.                Lower extremities:  No edema. No palpable cords or tenderness.      Pertinent Labs/Studies:       Assessment and orders:     Assessment and Plan:    Ariella Church is a 23 y.o. G***P*** s/p  at *** weeks *** days.  Patient having uncomplicated post-partum course.      Continue routine care  Call clinic or make appointment for symptoms of sadness  Follow up for yearly well woman exam.      Pt was discussed with  *** (attending physician).    I have reviewed patient medical and social

## 2024-02-07 ENCOUNTER — OFFICE VISIT (OUTPATIENT)
Age: 24
End: 2024-02-07
Payer: MEDICAID

## 2024-02-07 VITALS
TEMPERATURE: 99 F | HEIGHT: 60 IN | WEIGHT: 174 LBS | HEART RATE: 89 BPM | RESPIRATION RATE: 17 BRPM | OXYGEN SATURATION: 98 % | SYSTOLIC BLOOD PRESSURE: 143 MMHG | DIASTOLIC BLOOD PRESSURE: 85 MMHG | BODY MASS INDEX: 34.16 KG/M2

## 2024-02-07 DIAGNOSIS — I10 PRIMARY HYPERTENSION: ICD-10-CM

## 2024-02-07 DIAGNOSIS — O14.93 PRE-ECLAMPSIA IN THIRD TRIMESTER: ICD-10-CM

## 2024-02-07 LAB
ALBUMIN SERPL-MCNC: 3.1 G/DL (ref 3.5–5)
ALBUMIN/GLOB SERPL: 0.8 (ref 1.1–2.2)
ALP SERPL-CCNC: 158 U/L (ref 45–117)
ALT SERPL-CCNC: 31 U/L (ref 12–78)
ANION GAP SERPL CALC-SCNC: 7 MMOL/L (ref 5–15)
AST SERPL-CCNC: 21 U/L (ref 15–37)
BASOPHILS # BLD: 0.1 K/UL (ref 0–0.1)
BASOPHILS NFR BLD: 1 % (ref 0–1)
BILIRUB SERPL-MCNC: 0.4 MG/DL (ref 0.2–1)
BUN SERPL-MCNC: 9 MG/DL (ref 6–20)
BUN/CREAT SERPL: 19 (ref 12–20)
CALCIUM SERPL-MCNC: 9.3 MG/DL (ref 8.5–10.1)
CHLORIDE SERPL-SCNC: 109 MMOL/L (ref 97–108)
CO2 SERPL-SCNC: 26 MMOL/L (ref 21–32)
CREAT SERPL-MCNC: 0.48 MG/DL (ref 0.55–1.02)
DIFFERENTIAL METHOD BLD: ABNORMAL
EOSINOPHIL # BLD: 0.2 K/UL (ref 0–0.4)
EOSINOPHIL NFR BLD: 3 % (ref 0–7)
ERYTHROCYTE [DISTWIDTH] IN BLOOD BY AUTOMATED COUNT: 16.1 % (ref 11.5–14.5)
GLOBULIN SER CALC-MCNC: 3.8 G/DL (ref 2–4)
GLUCOSE SERPL-MCNC: 89 MG/DL (ref 65–100)
HCT VFR BLD AUTO: 33.3 % (ref 35–47)
HGB BLD-MCNC: 10.6 G/DL (ref 11.5–16)
IMM GRANULOCYTES # BLD AUTO: 0.1 K/UL (ref 0–0.04)
IMM GRANULOCYTES NFR BLD AUTO: 1 % (ref 0–0.5)
LYMPHOCYTES # BLD: 1.5 K/UL (ref 0.8–3.5)
LYMPHOCYTES NFR BLD: 19 % (ref 12–49)
MCH RBC QN AUTO: 28.6 PG (ref 26–34)
MCHC RBC AUTO-ENTMCNC: 31.8 G/DL (ref 30–36.5)
MCV RBC AUTO: 90 FL (ref 80–99)
MONOCYTES # BLD: 0.5 K/UL (ref 0–1)
MONOCYTES NFR BLD: 6 % (ref 5–13)
NEUTS SEG # BLD: 5.8 K/UL (ref 1.8–8)
NEUTS SEG NFR BLD: 70 % (ref 32–75)
NRBC # BLD: 0 K/UL (ref 0–0.01)
NRBC BLD-RTO: 0 PER 100 WBC
PLATELET # BLD AUTO: 434 K/UL (ref 150–400)
PMV BLD AUTO: 10.5 FL (ref 8.9–12.9)
POTASSIUM SERPL-SCNC: 4.4 MMOL/L (ref 3.5–5.1)
PROT SERPL-MCNC: 6.9 G/DL (ref 6.4–8.2)
RBC # BLD AUTO: 3.7 M/UL (ref 3.8–5.2)
SODIUM SERPL-SCNC: 142 MMOL/L (ref 136–145)
WBC # BLD AUTO: 8.2 K/UL (ref 3.6–11)

## 2024-02-07 PROCEDURE — 99213 OFFICE O/P EST LOW 20 MIN: CPT

## 2024-02-07 RX ORDER — NIFEDIPINE 30 MG/1
30 TABLET, EXTENDED RELEASE ORAL DAILY
Qty: 30 TABLET | Refills: 1 | Status: SHIPPED | OUTPATIENT
Start: 2024-02-07

## 2024-02-07 ASSESSMENT — PATIENT HEALTH QUESTIONNAIRE - PHQ9
2. FEELING DOWN, DEPRESSED OR HOPELESS: 0
1. LITTLE INTEREST OR PLEASURE IN DOING THINGS: 0
SUM OF ALL RESPONSES TO PHQ QUESTIONS 1-9: 0
SUM OF ALL RESPONSES TO PHQ9 QUESTIONS 1 & 2: 0

## 2024-02-07 NOTE — PROGRESS NOTES
[address] [id number]   Office [phone], Fax [phone]    Subjective:     History provided by patient     1 week  incision check and Post Partum Note    23 y.o. female , presenting for postpartum visit s/p rLTCS at 37 weeks 2 days for gHTN. Pregnancy complicated by gHTN, obesity, h/o CS d/t breech presentation.  Diagnosed with pre-eclampsia after PCr 1.3, no severe features. Received 1 dose of IV labetalol . Remained normotensive for the majority of her postpartum stay.  Patient doing well post-partum without significant complaint.      Lochia: normal  Pain: controlled  Baby: doing well, has seen PCP  Sexual activity: yes  Plan for contraception: wants nexplanon, forms filled today  Breast/bottle: both, no feeding difficulty,  Support from FOB/family: aunt,   Symptoms of depression: no  EDPS score: 7    HTN: today /85> 141/97.  No headache, blurring of vision, right upper quadrant pain, swelling of legs.  Has not been on any blood pressure medications in the postpartum. No home meds every day. Home BP in 110-130/80. Denies chest pain, headaches, blurring of vision, pedal edema, SOB.  Diet:regular  Exercise: none  Smoking: none            Objective:     Vitals:    24 0939   BP: (!) 143/85   Pulse: 89   Resp: 17   Temp: 99 °F (37.2 °C)   SpO2: 98%           Exam:  Patient without distress.    Abdomen soft, fundus firm at level of umbilicus, nontender.    Skin: low transverse  incision is clean dry and intact.  No sign of infection.                Lower extremities:  No edema. No palpable cords or tenderness.      Pertinent Labs/Studies:       Assessment and orders:     Assessment and Plan:    Ariella Bryson Church is a 23 y.o.     1. Postpartum care and examination  - Regular post partum period  - Filled nexplanon forms today, advised to have barrier contraception till then, advice inter preg interval of 18 months.    2. Primary

## 2024-02-07 NOTE — PROGRESS NOTES
Pt roomed by first and last name and .    Session # [id number]   # [id number]    Chief Complaint   Patient presents with    Postpartum Care        Vitals:    24 0939   BP: (!) 143/85   Pulse: 89   Resp: 17   Temp: 99 °F (37.2 °C)   TempSrc: Temporal   SpO2: 98%   Weight: 78.9 kg (174 lb)   Height: 1.524 m (5')        1. Have you been to the ER, urgent care clinic since your last visit?  Hospitalized since your last visit? Yes.  [facility].    2. Have you seen or consulted any other health care providers outside of the [facility] since your last visit?  Include any pap smears or colon screening. No

## 2024-02-08 NOTE — PROGRESS NOTES
[facility] Attending Attestation: While the patient was in clinic or immediately following the patient leaving the clinic, I reviewed the patient's medical history, the resident's findings on physical examination, and the patient's diagnosis and treatment plan with the resident and agree with the documentation in the note.     Arnie Boswell MD

## 2024-02-09 ENCOUNTER — TELEPHONE (OUTPATIENT)
Age: 24
End: 2024-02-09

## 2024-02-09 DIAGNOSIS — O99.013 ANEMIA DURING PREGNANCY IN THIRD TRIMESTER: ICD-10-CM

## 2024-02-09 RX ORDER — FERROUS SULFATE 325(65) MG
325 TABLET ORAL EVERY OTHER DAY
Qty: 90 TABLET | Refills: 0 | Status: SHIPPED | OUTPATIENT
Start: 2024-02-09 | End: 2024-08-07

## 2024-02-15 ENCOUNTER — OFFICE VISIT (OUTPATIENT)
Age: 24
End: 2024-02-15
Payer: MEDICAID

## 2024-02-15 VITALS
HEART RATE: 91 BPM | TEMPERATURE: 98.4 F | SYSTOLIC BLOOD PRESSURE: 136 MMHG | RESPIRATION RATE: 18 BRPM | BODY MASS INDEX: 33.57 KG/M2 | OXYGEN SATURATION: 98 % | WEIGHT: 171 LBS | DIASTOLIC BLOOD PRESSURE: 86 MMHG | HEIGHT: 60 IN

## 2024-02-15 DIAGNOSIS — I10 PRIMARY HYPERTENSION: Primary | ICD-10-CM

## 2024-02-15 PROCEDURE — 99213 OFFICE O/P EST LOW 20 MIN: CPT | Performed by: STUDENT IN AN ORGANIZED HEALTH CARE EDUCATION/TRAINING PROGRAM

## 2024-02-15 NOTE — PROGRESS NOTES
[address] [id number]   Office [phone], Fax [phone]    Subjective:     History provided by patient     2 week  incision check and Post Partum Note    23 y.o. female , presenting for postpartum visit s/p C/S delivery at 37w2d on 24.  Patient doing well post-partum without significant complaint.      Lochia: normal  Pain: controlled  Baby: doing well, has seen PCP  Sexual activity: has not resumed  Plan for contraception: Nexplanon, forms signed  Breast/bottle: breast + formula  Support from FOB/family: has support from family  Symptoms of depression: none  EDPS score: 2    HTN vs pre-e:  - Home BP: 116-130/60-70  - Taking Procardia daily  - No CP, palp, dyspnea, HA, vision changes, RUQ pain, swelling of LE      Objective:     Vitals:    02/15/24 1002   BP: 136/86   Pulse:    Resp:    Temp:    SpO2:            Exam:  Patient without distress.    Abdomen soft, fundus firm at level of umbilicus, nontender.    Skin: low transverse  incision is clean dry and intact.  No sign of infection.                Lower extremities:  No edema. No palpable cords or tenderness.      Assessment and orders:     Assessment and Plan:    Ariella Massey Ranjit is a 23 y.o. rLTCS. Course c/b pre-ecclampsia. Previously had Labetalol ordered during her postpartum course, but had to be held d/t normotensive pressures. Started on Procardia 2 wks ago, doing well on it, no SF.    Continue routine care  Will f/up in 2 more weeks to ensure BP well controlled  Nexplanon forms signed, awaiting arrival for placement  Follow up for yearly well woman exam.      Pt was discussed with Dr Buck (attending physician).    I have reviewed patient medical and social history and medications.  I have reviewed pertinent labs results and other data. I have discussed the diagnosis with the patient and the intended plan as seen in the above orders. The patient has received an after-visit summary

## 2024-02-15 NOTE — PROGRESS NOTES
Patient has been identified by name and .    Chief Complaint   Patient presents with    Postpartum Care       Vitals:    02/15/24 1000 02/15/24 1002   BP: (!) 144/96 136/86   Site: Right Upper Arm Right Upper Arm   Position: Sitting Sitting   Cuff Size: Medium Adult Medium Adult   Pulse: 91    Resp: 18    Temp: 98.4 °F (36.9 °C)    TempSrc: Oral    SpO2: 98%    Weight: 77.6 kg (171 lb)    Height: 1.524 m (5')         \"Have you been to the ER, urgent care clinic since your last visit?  Hospitalized since your last visit?\"    YES, patient postpartum - had baby at [facility]    “Have you seen or consulted any other health care providers outside of [facility] since your last visit?”    NO

## 2024-03-04 ENCOUNTER — TELEPHONE (OUTPATIENT)
Age: 24
End: 2024-03-04

## 2024-03-04 NOTE — TELEPHONE ENCOUNTER
I called the patient to let her know that we have received her Nexplanon implant in clinic today. I scheduled the patient for 03/15/2024. Patient is aware about date and time of appointment for the procedure appointment.

## 2024-03-07 ENCOUNTER — OFFICE VISIT (OUTPATIENT)
Age: 24
End: 2024-03-07
Payer: MEDICAID

## 2024-03-07 VITALS
SYSTOLIC BLOOD PRESSURE: 134 MMHG | RESPIRATION RATE: 18 BRPM | BODY MASS INDEX: 33.38 KG/M2 | HEIGHT: 60 IN | WEIGHT: 170 LBS | HEART RATE: 91 BPM | DIASTOLIC BLOOD PRESSURE: 82 MMHG | OXYGEN SATURATION: 96 % | TEMPERATURE: 98.4 F

## 2024-03-07 DIAGNOSIS — I10 PRIMARY HYPERTENSION: ICD-10-CM

## 2024-03-07 PROCEDURE — 0503F POSTPARTUM CARE VISIT: CPT

## 2024-03-07 PROCEDURE — 3079F DIAST BP 80-89 MM HG: CPT

## 2024-03-07 PROCEDURE — 3075F SYST BP GE 130 - 139MM HG: CPT

## 2024-03-07 PROCEDURE — 99213 OFFICE O/P EST LOW 20 MIN: CPT

## 2024-03-07 ASSESSMENT — PATIENT HEALTH QUESTIONNAIRE - PHQ9
SUM OF ALL RESPONSES TO PHQ QUESTIONS 1-9: 0
SUM OF ALL RESPONSES TO PHQ9 QUESTIONS 1 & 2: 0
SUM OF ALL RESPONSES TO PHQ QUESTIONS 1-9: 0
SUM OF ALL RESPONSES TO PHQ QUESTIONS 1-9: 0
1. LITTLE INTEREST OR PLEASURE IN DOING THINGS: 0
SUM OF ALL RESPONSES TO PHQ QUESTIONS 1-9: 0
2. FEELING DOWN, DEPRESSED OR HOPELESS: 0

## 2024-03-07 NOTE — PROGRESS NOTES
Ariella Church (:  2000) is a 23 y.o. female,, here for evaluation of the following chief complaint(s):  Postpartum Care         ASSESSMENT/PLAN:  1. Encounter for postpartum visit  2. Primary hypertension      Return in about 1 month (around 2024) for HTN.         Subjective   SUBJECTIVE/OBJECTIVE:  HPI    Review of Systems       Objective   Physical Exam             An electronic signature was used to authenticate this note.    --Annalisa Goff LPN Pt roomed by first and last name and .    Session # [id number]   Int # [id number]     Chief Complaint   Patient presents with    Postpartum Care        Vitals:    24 0951   BP: 134/82   Pulse: 91   Resp: 18   Temp: 98.4 °F (36.9 °C)   TempSrc: Temporal   SpO2: 96%   Weight: 77.1 kg (170 lb)   Height: 1.524 m (5')        1. Have you been to the ER, urgent care clinic since your last visit?  Hospitalized since your last visit?No    2. Have you seen or consulted any other health care providers outside of the [facility] since your last visit?  Include any pap smears or colon screening. No

## 2024-03-10 NOTE — PROGRESS NOTES
[address] [place] [address]   Office [phone], Fax [phone]    Subjective:     History provided by patient       6 week  incision check and Post Partum Note    23 y.o. female , presenting for postpartum visit s/p rLTCS delivery at 37w2d.  Patient doing well post-partum without significant complaint.      Lochia: normal  Pain: controlled  Baby: doing well, has seen PCP  Plan for contraception: Nexplanon-- has an appt scheduled for placement on 3/15  Breast/bottle: both  Support from FOB/family: yes  Symptoms of depression: none    EDPS score: 4    Gestational HTN   - majority of home BP readings controlled  - taking Procardia 30mg daily  - denies chest pain, palpitations, dyspnea, vision changes,        Objective:     Vitals:    24 0951   BP: 134/82   Pulse: 91   Resp: 18   Temp: 98.4 °F (36.9 °C)   SpO2: 96%           Exam:  Patient without distress.    Abdomen soft, nontender.    Skin: low transverse  incision is clean dry and intact.  No sign of infection.                Lower extremities:  No edema.      Pertinent Labs/Studies:       Assessment and orders:     Assessment and Plan:    Ariella Church is a 23 y.o.  s/p rLTCS at 37 weeks 2 days.  Patient having uncomplicated post-partum course.      Ariella was seen today for postpartum care.    Diagnoses and all orders for this visit:    Primary vs gestational hypertension  BP in office today 134/82. Home log with majority of readings in controlled range.  - continue Procardia 30mg daily   - bring log to next visit; if she remains controlled may consider weaning off meds     Encounter for postpartum visit  - Continue routine care  - Call clinic or make appointment for symptoms of sadness  - Follow up for yearly well woman exam.    - follow up on 3/15 for nexplanon placement      Pt was discussed with Dr Del Rio (attending physician).    Stephanie Graves MD

## 2024-03-15 ENCOUNTER — PROCEDURE VISIT (OUTPATIENT)
Age: 24
End: 2024-03-15
Payer: MEDICAID

## 2024-03-15 VITALS
RESPIRATION RATE: 18 BRPM | DIASTOLIC BLOOD PRESSURE: 67 MMHG | HEIGHT: 60 IN | WEIGHT: 168 LBS | BODY MASS INDEX: 32.98 KG/M2 | OXYGEN SATURATION: 99 % | SYSTOLIC BLOOD PRESSURE: 132 MMHG | TEMPERATURE: 98.7 F | HEART RATE: 76 BPM

## 2024-03-15 DIAGNOSIS — Z30.017 ENCOUNTER FOR INITIAL PRESCRIPTION OF NEXPLANON: ICD-10-CM

## 2024-03-15 DIAGNOSIS — N94.6 DYSMENORRHEA: Primary | ICD-10-CM

## 2024-03-15 LAB
HCG, PREGNANCY, URINE, POC: NEGATIVE
VALID INTERNAL CONTROL, POC: NORMAL

## 2024-03-15 PROCEDURE — 81025 URINE PREGNANCY TEST: CPT | Performed by: FAMILY MEDICINE

## 2024-03-15 PROCEDURE — 11981 INSERTION DRUG DLVR IMPLANT: CPT

## 2024-03-15 RX ADMIN — ETONOGESTREL 68 MG: 68 IMPLANT SUBCUTANEOUS at 11:13

## 2024-03-15 ASSESSMENT — PATIENT HEALTH QUESTIONNAIRE - PHQ9
SUM OF ALL RESPONSES TO PHQ QUESTIONS 1-9: 0
SUM OF ALL RESPONSES TO PHQ QUESTIONS 1-9: 0
SUM OF ALL RESPONSES TO PHQ9 QUESTIONS 1 & 2: 0
SUM OF ALL RESPONSES TO PHQ QUESTIONS 1-9: 0
SUM OF ALL RESPONSES TO PHQ QUESTIONS 1-9: 0
2. FEELING DOWN, DEPRESSED OR HOPELESS: 0
1. LITTLE INTEREST OR PLEASURE IN DOING THINGS: 0

## 2024-03-15 NOTE — PROGRESS NOTES
Ariella Church is a 23 y.o. female      Chief Complaint   Patient presents with    Procedure     Patient is coming in for a Nexplanon insertion. Her LMP was 03/14/2024. Her last sexual intercourse was during pregnancy. She is not on any other birth control. She stopped breastfeeding on Friday. No other concerns.        \"Have you been to the ER, urgent care clinic since your last visit?  Hospitalized since your last visit?\"    NO    “Have you seen or consulted any other health care providers outside of [facility] since your last visit?”    NO              Vitals:    03/15/24 1031 03/15/24 1043   BP: (!) 145/91 132/67   Site: Right Upper Arm Left Upper Arm   Position: Sitting Sitting   Pulse: 76    Resp: 18    Temp: 98.7 °F (37.1 °C)    TempSrc: Oral    SpO2: 99%    Weight: 76.2 kg (168 lb)    Height: 1.524 m (5')             Health Maintenance Due   Topic Date Due    Hepatitis B vaccine (1 of 3 - 3-dose series) Never done    COVID-19 Vaccine (1) Never done    Varicella vaccine (1 of 2 - 2-dose childhood series) Never done    HPV vaccine (1 - 2-dose series) Never done         Medication Reconciliation completed, changes noted.  Please  Update medication list.

## 2024-03-15 NOTE — PROGRESS NOTES
I saw and evaluated the patient, performing the key elements of the service.  I discussed the findings, assessment and plan with the resident and agree with the resident's findings and plan as documented in the resident's note. I was present and assisted with the procedure.     Chief Complaint   Patient presents with    Procedure     Patient is coming in for a Nexplanon insertion. Her LMP was 03/14/2024. Her last sexual intercourse was during pregnancy. She is not on any other birth control. She stopped breastfeeding on Friday. No other concerns.

## 2024-03-15 NOTE — PROGRESS NOTES
[facility]  OFFICE PROCEDURE PROGRESS NOTE        Chart reviewed for the following:   Chucho LOPEZ MD, have reviewed the History, Physical and updated the Allergic reactions for Ariella Massey Ranjit     TIME OUT performed immediately prior to start of procedure:   Chucho LOPEZ MD, have performed the following reviews on Ariella Massey Ranjit prior to the start of the procedure:            * Patient was identified by name and date of birth   * Agreement on procedure being performed was verified  * Risks and Benefits explained to the patient  * Procedure site verified and marked as necessary  * Patient was positioned for comfort  * Consent was signed and verified     Time: 11:00 AM    Date of procedure: 3/15/2024    Procedure performed by: Chucho Valadez MD   Provider assisted by:  Tracy Leggett DO    Patient assisted by: self    How tolerated by patient: tolerated the procedure well with no complications    Post Procedural Pain Scale: 0 - No Hurt    Comments: none      The patient's right arm was selected and the proposed site marked with a sterile marking pen.  The site was cleaned with chloraprep x3.  The site was injected with 3mL 1% lidocaine from insertion to the full extent of the implant.  The insertion device was then used to elevated the skin and tunnel under the skin the full length of the implant.  The device was then deployed and withdrawn leaving the implant in place.  The implant was palpated to ensure proper placement.  The insertion device was inspected to insure that the entire device was deployed.     Guaze was placed over the incision and a pressure wrap was placed around the arm to reduce swelling overnight    The patient was given her information card and reminded that the device should be removed no later than three years and that contrceptive effectiveness was not guaranteed after that date.  The patient expressed understanding.

## 2024-04-11 ENCOUNTER — OFFICE VISIT (OUTPATIENT)
Age: 24
End: 2024-04-11
Payer: MEDICAID

## 2024-04-11 VITALS
HEIGHT: 60 IN | TEMPERATURE: 98.3 F | DIASTOLIC BLOOD PRESSURE: 73 MMHG | SYSTOLIC BLOOD PRESSURE: 117 MMHG | RESPIRATION RATE: 16 BRPM | HEART RATE: 86 BPM | OXYGEN SATURATION: 98 % | BODY MASS INDEX: 33.96 KG/M2 | WEIGHT: 173 LBS

## 2024-04-11 DIAGNOSIS — J30.2 SEASONAL ALLERGIES: ICD-10-CM

## 2024-04-11 DIAGNOSIS — R21 RASH DUE TO ALLERGY: ICD-10-CM

## 2024-04-11 DIAGNOSIS — I10 PRIMARY HYPERTENSION: Primary | ICD-10-CM

## 2024-04-11 DIAGNOSIS — T78.40XA RASH DUE TO ALLERGY: ICD-10-CM

## 2024-04-11 PROCEDURE — 99213 OFFICE O/P EST LOW 20 MIN: CPT

## 2024-04-11 PROCEDURE — 3074F SYST BP LT 130 MM HG: CPT

## 2024-04-11 PROCEDURE — 3078F DIAST BP <80 MM HG: CPT

## 2024-04-11 RX ORDER — ETONOGESTREL 68 MG/1
IMPLANT SUBCUTANEOUS
COMMUNITY
Start: 2024-02-28

## 2024-04-11 RX ORDER — BENZOCAINE/MENTHOL 6 MG-10 MG
LOZENGE MUCOUS MEMBRANE
Qty: 30 G | Refills: 1 | Status: SHIPPED | OUTPATIENT
Start: 2024-04-11 | End: 2024-04-18

## 2024-04-11 RX ORDER — CETIRIZINE HYDROCHLORIDE 10 MG/1
10 TABLET ORAL DAILY
Qty: 90 TABLET | Refills: 0 | Status: SHIPPED | OUTPATIENT
Start: 2024-04-11

## 2024-04-11 ASSESSMENT — PATIENT HEALTH QUESTIONNAIRE - PHQ9
SUM OF ALL RESPONSES TO PHQ QUESTIONS 1-9: 0
SUM OF ALL RESPONSES TO PHQ QUESTIONS 1-9: 0
1. LITTLE INTEREST OR PLEASURE IN DOING THINGS: NOT AT ALL
SUM OF ALL RESPONSES TO PHQ QUESTIONS 1-9: 0
SUM OF ALL RESPONSES TO PHQ9 QUESTIONS 1 & 2: 0
SUM OF ALL RESPONSES TO PHQ QUESTIONS 1-9: 0
2. FEELING DOWN, DEPRESSED OR HOPELESS: NOT AT ALL

## 2024-04-11 NOTE — PROGRESS NOTES
Room - 22  Session code - 29096 , int - [id number]    Patient is accompanied by self. I have received verbal consent from Ariella Church to discuss any/all medical information while they are present in the room.    Identified pt with two pt identifiers(name and ). Reviewed record in preparation for visit and have obtained necessary documentation.  Chief Complaint   Patient presents with    Hypertension        Health Maintenance Due   Topic    Hepatitis B vaccine (1 of 3 - 3-dose series)    COVID-19 Vaccine (1)    Varicella vaccine (1 of 2 - 2-dose childhood series)    HPV vaccine (1 - 2-dose series)       Vitals:    24 1039   BP: 117/73   Site: Left Upper Arm   Position: Sitting   Cuff Size: Large Adult   Pulse: 86   Resp: 16   Temp: 98.3 °F (36.8 °C)   TempSrc: Temporal   SpO2: 98%   Weight: 78.5 kg (173 lb)   Height: 1.524 m (5')       Social Determinants Of Health:       SDOH screening not required at visit.  Resources Declined.   See AVS for attached resources, if requested.    Coordination of Care Questionnaire:       \"Have you been to the ER, urgent care clinic since your last visit?  Hospitalized since your last visit?\"    NO    “Have you seen or consulted any other health care providers outside of [facility] since your last visit?”    NO            Click Here for Release of Records Request

## 2024-04-11 NOTE — PROGRESS NOTES
[address] [place] [address]   Office [phone], Fax [phone]  Subjective   Ariella Church is a 23 y.o. female who presents for:    #HTN: was diagnosed with preeclampsia during third trimester.   - BP log reviewed,   - pt was taking Procardia 30 daily       Past Medical History  Past Medical History:   Diagnosis Date    Gestational hypertension affecting third pregnancy 01/31/2024    Primary hypertension 04/13/2024       Current Medications  Current Outpatient Medications   Medication Sig Dispense Refill    NEXPLANON 68 MG implant       hydrocortisone 1 % cream Apply topically 2 times daily for no more than 2 weeks at a time. 30 g 1    cetirizine (ZYRTEC) 10 MG tablet Take 1 tablet by mouth daily 90 tablet 0    ferrous sulfate (IRON 325) 325 (65 Fe) MG tablet Take 1 tablet by mouth every other day 90 tablet 0    ibuprofen (ADVIL;MOTRIN) 800 MG tablet Take 1 tablet by mouth every 8 hours 30 tablet 0    docusate sodium (COLACE, DULCOLAX) 100 MG CAPS Take 100 mg by mouth 2 times daily 60 capsule 0    Prenatal MV-Min-Fe Fum-FA-DHA (PRENATAL 1 PO) Take by mouth       No current facility-administered medications for this visit.         Objective   Vital Signs  Vitals:    04/11/24 1039   BP: 117/73   Pulse: 86   Resp: 16   Temp: 98.3 °F (36.8 °C)   SpO2: 98%       Physical Examination  Physical Exam  Vitals and nursing note reviewed.   Constitutional:       General: She is not in acute distress.     Appearance: Normal appearance. She is not ill-appearing.   HENT:      Head: Normocephalic and atraumatic.      Mouth/Throat:      Mouth: Mucous membranes are moist.   Cardiovascular:      Rate and Rhythm: Normal rate and regular rhythm.      Heart sounds: No murmur heard.  Pulmonary:      Effort: Pulmonary effort is normal. No respiratory distress.      Breath sounds: No wheezing.   Abdominal:      General: Abdomen is flat. There is no distension.      Palpations: Abdomen is soft.

## 2024-04-13 PROBLEM — I10 PRIMARY HYPERTENSION: Status: ACTIVE | Noted: 2024-04-13

## 2024-04-13 PROBLEM — J30.2 SEASONAL ALLERGIES: Status: ACTIVE | Noted: 2024-04-13

## 2024-04-13 PROBLEM — I10 PRIMARY HYPERTENSION: Status: RESOLVED | Noted: 2024-04-13 | Resolved: 2024-04-13

## 2024-07-02 ENCOUNTER — TELEPHONE (OUTPATIENT)
Age: 24
End: 2024-07-02

## 2024-07-02 NOTE — TELEPHONE ENCOUNTER
----- Message from Katelynn Mireles LPN sent at 6/24/2024  3:00 PM EDT -----  Regarding: pap in july Hi Eb,    This patient needs a pap appointment in July/August. Can you schedule her in gyn clinic for this? Thanks!!

## 2024-12-16 ENCOUNTER — TELEPHONE (OUTPATIENT)
Age: 24
End: 2024-12-16

## 2024-12-16 NOTE — TELEPHONE ENCOUNTER
----- Message from ROLY MCCALL LPN sent at 12/11/2024  2:33 PM EST -----  Can you call patient and schedule a pap? She had abnormal in 7/2023

## 2024-12-30 ENCOUNTER — OFFICE VISIT (OUTPATIENT)
Age: 24
End: 2024-12-30

## 2024-12-30 ENCOUNTER — HOSPITAL ENCOUNTER (OUTPATIENT)
Facility: HOSPITAL | Age: 24
Setting detail: SPECIMEN
Discharge: HOME | End: 2025-01-02
Payer: MEDICAID

## 2024-12-30 ENCOUNTER — OFFICE VISIT (OUTPATIENT)
Age: 24
End: 2024-12-30
Payer: MEDICAID

## 2024-12-30 VITALS
TEMPERATURE: 98.4 F | WEIGHT: 191 LBS | DIASTOLIC BLOOD PRESSURE: 76 MMHG | HEART RATE: 95 BPM | OXYGEN SATURATION: 96 % | HEIGHT: 60 IN | SYSTOLIC BLOOD PRESSURE: 128 MMHG | BODY MASS INDEX: 37.5 KG/M2 | RESPIRATION RATE: 18 BRPM

## 2024-12-30 DIAGNOSIS — I10 PRIMARY HYPERTENSION: ICD-10-CM

## 2024-12-30 DIAGNOSIS — Z59.71 UNINSURED: Primary | ICD-10-CM

## 2024-12-30 DIAGNOSIS — O14.93 PRE-ECLAMPSIA IN THIRD TRIMESTER: ICD-10-CM

## 2024-12-30 DIAGNOSIS — Z00.00 ANNUAL PHYSICAL EXAM: Primary | ICD-10-CM

## 2024-12-30 PROCEDURE — 99385 PREV VISIT NEW AGE 18-39: CPT

## 2024-12-30 PROCEDURE — 88175 CYTOPATH C/V AUTO FLUID REDO: CPT

## 2024-12-30 PROCEDURE — 87624 HPV HI-RISK TYP POOLED RSLT: CPT

## 2024-12-30 SDOH — ECONOMIC STABILITY: FOOD INSECURITY: WITHIN THE PAST 12 MONTHS, THE FOOD YOU BOUGHT JUST DIDN'T LAST AND YOU DIDN'T HAVE MONEY TO GET MORE.: NEVER TRUE

## 2024-12-30 SDOH — ECONOMIC STABILITY: FOOD INSECURITY: WITHIN THE PAST 12 MONTHS, YOU WORRIED THAT YOUR FOOD WOULD RUN OUT BEFORE YOU GOT MONEY TO BUY MORE.: NEVER TRUE

## 2024-12-30 SDOH — ECONOMIC STABILITY: INCOME INSECURITY: HOW HARD IS IT FOR YOU TO PAY FOR THE VERY BASICS LIKE FOOD, HOUSING, MEDICAL CARE, AND HEATING?: NOT VERY HARD

## 2024-12-30 ASSESSMENT — PATIENT HEALTH QUESTIONNAIRE - PHQ9
SUM OF ALL RESPONSES TO PHQ QUESTIONS 1-9: 0
2. FEELING DOWN, DEPRESSED OR HOPELESS: NOT AT ALL
1. LITTLE INTEREST OR PLEASURE IN DOING THINGS: NOT AT ALL
SUM OF ALL RESPONSES TO PHQ QUESTIONS 1-9: 0
SUM OF ALL RESPONSES TO PHQ9 QUESTIONS 1 & 2: 0
SUM OF ALL RESPONSES TO PHQ QUESTIONS 1-9: 0
SUM OF ALL RESPONSES TO PHQ QUESTIONS 1-9: 0

## 2024-12-30 NOTE — PROGRESS NOTES
Medicaid enrollment visit with [employer] Navigator. [employer]UNCION assisted patient with Medicaid enrollment process via [email or web address]. Application completed today, pepe #[id number]. Applied for patient and oldest son (youngest child already has Medicaid). ID uploaded to application. [employer] Canalesator listed as authorized rep per patient's request/consent.      Patient advised she should hear back from [facility] within 45 days. Advised to respond to any request for additional documentation promptly and by due date to avoid denial of application. DSS will request proof of income.     Also completed BS FA application as patient may not be eligible for full-coverage Medicaid.      Plan:  Ongoing psychosocial support and resource referral, as desired by the patient and family.      OLIVIA Browne

## 2024-12-30 NOTE — PROGRESS NOTES
# [id number]    Patient has been identified by name and .    Chief Complaint   Patient presents with    Gynecologic Exam     Pt reports to get a Pap smear       Vitals:    24 1455   BP: 128/76   Site: Left Upper Arm   Position: Sitting   Cuff Size: Large Adult   Pulse: 95   Resp: 18   Temp: 98.4 °F (36.9 °C)   TempSrc: Oral   SpO2: 96%   Weight: 86.6 kg (191 lb)   Height: 1.524 m (5')        \"Have you been to the ER, urgent care clinic since your last visit?  Hospitalized since your last visit?\"    NO    “Have you seen or consulted any other health care providers outside of [facility] since your last visit?”    NO              
ordered.    Patient reported her LMP was in March after Nexplanon insertion, has had no.'s since that time.  Associated 20 pound weight gain.  Counseled patient that the symptoms are likely secondary to the Nexplanon.  However offered urine pregnancy test patient declined.    Pt was discussed with Dr Thomas (attending physician).    I have discussed the diagnosis with the patient and the intended plan as seen in the above orders.  The patient has received an after-visit summary and questions were answered concerning future plans.  I have discussed medication side effects and warnings with the patient as well. Informed pt to return to the office if new symptoms arise.      Tata Doe, DO  Resident [facility]  12/30/24

## 2024-12-31 LAB
ALBUMIN SERPL-MCNC: 4.4 G/DL (ref 3.5–5)
ALBUMIN/GLOB SERPL: 1.3 (ref 1.1–2.2)
ALP SERPL-CCNC: 131 U/L (ref 45–117)
ALT SERPL-CCNC: 35 U/L (ref 12–78)
ANION GAP SERPL CALC-SCNC: 6 MMOL/L (ref 2–12)
AST SERPL-CCNC: 20 U/L (ref 15–37)
BASOPHILS # BLD: 0 K/UL (ref 0–0.1)
BASOPHILS NFR BLD: 0 % (ref 0–1)
BILIRUB SERPL-MCNC: 0.2 MG/DL (ref 0.2–1)
BUN SERPL-MCNC: 13 MG/DL (ref 6–20)
BUN/CREAT SERPL: 19 (ref 12–20)
CALCIUM SERPL-MCNC: 9.5 MG/DL (ref 8.5–10.1)
CHLORIDE SERPL-SCNC: 106 MMOL/L (ref 97–108)
CHOLEST SERPL-MCNC: 191 MG/DL
CO2 SERPL-SCNC: 26 MMOL/L (ref 21–32)
CREAT SERPL-MCNC: 0.67 MG/DL (ref 0.55–1.02)
DIFFERENTIAL METHOD BLD: NORMAL
EOSINOPHIL # BLD: 0.2 K/UL (ref 0–0.4)
EOSINOPHIL NFR BLD: 2 % (ref 0–7)
ERYTHROCYTE [DISTWIDTH] IN BLOOD BY AUTOMATED COUNT: 13.5 % (ref 11.5–14.5)
GLOBULIN SER CALC-MCNC: 3.5 G/DL (ref 2–4)
GLUCOSE SERPL-MCNC: 100 MG/DL (ref 65–100)
HCT VFR BLD AUTO: 41.8 % (ref 35–47)
HDLC SERPL-MCNC: 54 MG/DL
HDLC SERPL: 3.5 (ref 0–5)
HGB BLD-MCNC: 13.9 G/DL (ref 11.5–16)
IMM GRANULOCYTES # BLD AUTO: 0 K/UL (ref 0–0.04)
IMM GRANULOCYTES NFR BLD AUTO: 0 % (ref 0–0.5)
LDLC SERPL CALC-MCNC: 98.4 MG/DL (ref 0–100)
LYMPHOCYTES # BLD: 2.8 K/UL (ref 0.8–3.5)
LYMPHOCYTES NFR BLD: 28 % (ref 12–49)
MCH RBC QN AUTO: 29.1 PG (ref 26–34)
MCHC RBC AUTO-ENTMCNC: 33.3 G/DL (ref 30–36.5)
MCV RBC AUTO: 87.6 FL (ref 80–99)
MONOCYTES # BLD: 0.6 K/UL (ref 0–1)
MONOCYTES NFR BLD: 6 % (ref 5–13)
NEUTS SEG # BLD: 6.2 K/UL (ref 1.8–8)
NEUTS SEG NFR BLD: 64 % (ref 32–75)
NRBC # BLD: 0 K/UL (ref 0–0.01)
NRBC BLD-RTO: 0 PER 100 WBC
PLATELET # BLD AUTO: 359 K/UL (ref 150–400)
PMV BLD AUTO: 10.2 FL (ref 8.9–12.9)
POTASSIUM SERPL-SCNC: 4 MMOL/L (ref 3.5–5.1)
PROT SERPL-MCNC: 7.9 G/DL (ref 6.4–8.2)
RBC # BLD AUTO: 4.77 M/UL (ref 3.8–5.2)
SODIUM SERPL-SCNC: 138 MMOL/L (ref 136–145)
TRIGL SERPL-MCNC: 193 MG/DL
VLDLC SERPL CALC-MCNC: 38.6 MG/DL
WBC # BLD AUTO: 9.8 K/UL (ref 3.6–11)

## 2024-12-31 NOTE — PATIENT INSTRUCTIONS
[email or web address]  Programas disponibles: Plan de igualdad de pagos mensuales, programa [employer] de calefacción, programa [employer] de agua, programa [employer] de conservación del Agua, asistencia a adultos mayores, otros programas de asistencia para combustible, planes de pago [employer], Programa de Asistencia para el Agua para [place] [employer] [employer] ([employer])    Centros de oportunidad financiera:  [employer]  Lo que ofrecen: servicios de asesoramiento gratuitos en las áreas de empleo, finanzas y acceso a beneficios  Número de teléfono: [phone]  Dirección: [address] [place] [id number]  Sitio web: [email or web address]-resource-[email or web address]-[email or web address]- [email or web address]-[place]/    [facility]  Lo que ofrecen: servicios de asesoramiento gratuitos en las áreas de empleo, finanzas y acceso a beneficios  Número de teléfono: [phone]  Correo electrónico: [email or web address]  Dirección: [address] [place] [address]  Sitio web: [email or web address] [facility] [employer]-[email or web address]          [facility] [email or web address]  Lo que ofrecen: servicios de asesoramiento gratuitos en las áreas de empleo, finanzas y acceso a beneficios  Número de teléfono: [phone]  Correo electrónico: [email or web address]  Dirección: [address] [place] [address]

## 2025-01-02 LAB — HPV I/H RISK 1 DNA CVX QL PROBE+SIG AMP: NEGATIVE

## 2025-01-03 ENCOUNTER — TELEPHONE (OUTPATIENT)
Age: 25
End: 2025-01-03

## (undated) DEVICE — GOWN,SIRUS,FABRNF,XL,20/CS: Brand: MEDLINE

## (undated) DEVICE — STERILE POLYISOPRENE POWDER-FREE SURGICAL GLOVES: Brand: PROTEXIS

## (undated) DEVICE — SOLIDIFIER FLUID 3000 CC ABSORB

## (undated) DEVICE — SOLUTION IV 1000ML 0.9% SOD CHL

## (undated) DEVICE — C-SECTION II-LF: Brand: MEDLINE INDUSTRIES, INC.

## (undated) DEVICE — 3000CC GUARDIAN II: Brand: GUARDIAN

## (undated) DEVICE — STRAP,POSITIONING,KNEE/BODY,FOAM,4X60": Brand: MEDLINE

## (undated) DEVICE — TRAY,URINE METER,100% SILICONE,16FR10ML: Brand: MEDLINE

## (undated) DEVICE — APPLICATOR MEDICATED 26 CC SOLUTION HI LT ORNG CHLORAPREP

## (undated) DEVICE — TOWEL,OR,DSP,ST,BLUE,STD,2/PK,40PK/CS: Brand: MEDLINE

## (undated) DEVICE — SUTURE MCRYL SZ 0 L36IN ABSRB UD L36MM CT-1 1/2 CIR Y946H

## (undated) DEVICE — SUTURE MCRYL 2 0 L27IN ABSRB VLT CT MFIL Y351H

## (undated) DEVICE — PENCIL ES L3M ROCK SWCH S STL HEX LOK BLDE ELECTRD HOLSTER

## (undated) DEVICE — CLEANER ES TIP W2XL2IN ADH BK RADPQ FOR S STL ELECTRD

## (undated) DEVICE — ELECTRODE PT RET AD L9FT HI MOIST COND ADH HYDRGEL CORDED

## (undated) DEVICE — GARMENT,MEDLINE,DVT,INT,CALF,MED, GEN2: Brand: MEDLINE

## (undated) DEVICE — SUTURE VCRL + SZ 4-0 L27IN ABSRB UD KS STR REV CUT NDL VCP662H

## (undated) DEVICE — INTENT OT USE PROVIDES A STERILE INTERFACE BETWEEN THE OPERATING ROOM SURGICAL LAMPS (NON-STERILE) AND THE SURGEON OR STAFF WORKING IN THE STERILE FIELD.: Brand: ASPEN® ALC PLUS LIGHT HANDLE COVER

## (undated) DEVICE — SUTURE VCRL SZ 0 L36IN ABSRB VLT L40MM CT 1/2 CIR J358H